# Patient Record
Sex: FEMALE | Race: WHITE | Employment: OTHER | URBAN - METROPOLITAN AREA
[De-identification: names, ages, dates, MRNs, and addresses within clinical notes are randomized per-mention and may not be internally consistent; named-entity substitution may affect disease eponyms.]

---

## 2017-11-30 ENCOUNTER — HOSPITAL ENCOUNTER (OUTPATIENT)
Dept: MAMMOGRAPHY | Age: 77
Discharge: HOME | End: 2017-11-30
Attending: INTERNAL MEDICINE
Payer: MEDICARE

## 2017-11-30 DIAGNOSIS — Z12.31 VISIT FOR SCREENING MAMMOGRAM: ICD-10-CM

## 2017-11-30 PROCEDURE — 77063 BREAST TOMOSYNTHESIS BI: CPT

## 2018-12-05 ENCOUNTER — HOSPITAL ENCOUNTER (OUTPATIENT)
Dept: MAMMOGRAPHY | Age: 78
Discharge: HOME | End: 2018-12-05
Attending: INTERNAL MEDICINE
Payer: MEDICARE

## 2018-12-05 DIAGNOSIS — Z12.39 BREAST SCREENING: ICD-10-CM

## 2018-12-05 PROCEDURE — 77063 BREAST TOMOSYNTHESIS BI: CPT

## 2020-02-13 ENCOUNTER — HOSPITAL ENCOUNTER (OUTPATIENT)
Dept: MAMMOGRAPHY | Age: 80
Discharge: HOME | End: 2020-02-13
Attending: INTERNAL MEDICINE
Payer: MEDICARE

## 2020-02-13 DIAGNOSIS — Z12.31 VISIT FOR SCREENING MAMMOGRAM: ICD-10-CM

## 2020-02-13 PROCEDURE — 77063 BREAST TOMOSYNTHESIS BI: CPT

## 2020-08-25 ENCOUNTER — OFFICE VISIT (OUTPATIENT)
Dept: OBGYN CLINIC | Age: 80
End: 2020-08-25
Payer: MEDICARE

## 2020-08-25 VITALS
DIASTOLIC BLOOD PRESSURE: 80 MMHG | WEIGHT: 115.38 LBS | SYSTOLIC BLOOD PRESSURE: 122 MMHG | HEIGHT: 64 IN | BODY MASS INDEX: 19.7 KG/M2

## 2020-08-25 DIAGNOSIS — N89.8 VAGINAL DISCHARGE: Primary | ICD-10-CM

## 2020-08-25 PROCEDURE — 1090F PRES/ABSN URINE INCON ASSESS: CPT | Performed by: OBSTETRICS & GYNECOLOGY

## 2020-08-25 PROCEDURE — G8427 DOCREV CUR MEDS BY ELIG CLIN: HCPCS | Performed by: OBSTETRICS & GYNECOLOGY

## 2020-08-25 PROCEDURE — G8432 DEP SCR NOT DOC, RNG: HCPCS | Performed by: OBSTETRICS & GYNECOLOGY

## 2020-08-25 PROCEDURE — 1101F PT FALLS ASSESS-DOCD LE1/YR: CPT | Performed by: OBSTETRICS & GYNECOLOGY

## 2020-08-25 PROCEDURE — 99204 OFFICE O/P NEW MOD 45 MIN: CPT | Performed by: OBSTETRICS & GYNECOLOGY

## 2020-08-25 PROCEDURE — G8420 CALC BMI NORM PARAMETERS: HCPCS | Performed by: OBSTETRICS & GYNECOLOGY

## 2020-08-25 PROCEDURE — G8400 PT W/DXA NO RESULTS DOC: HCPCS | Performed by: OBSTETRICS & GYNECOLOGY

## 2020-08-25 PROCEDURE — G8536 NO DOC ELDER MAL SCRN: HCPCS | Performed by: OBSTETRICS & GYNECOLOGY

## 2020-08-25 RX ORDER — CELECOXIB 200 MG/1
200 CAPSULE ORAL DAILY
COMMUNITY

## 2020-08-25 RX ORDER — CHOLECALCIFEROL (VITAMIN D3) 125 MCG
3000 CAPSULE ORAL
COMMUNITY

## 2020-08-25 RX ORDER — PANCRELIPASE 36000; 180000; 114000 [USP'U]/1; [USP'U]/1; [USP'U]/1
CAPSULE, DELAYED RELEASE PELLETS ORAL
COMMUNITY
Start: 2020-08-14

## 2020-08-25 NOTE — PROGRESS NOTES
Vaginitis  CC: Vaginal discharge    HPI: Ms. Barbara Hernandez is a 78 y.o. No obstetric history on file. female presenting for evaluation of vaginal discharge. She has no additional complaints. She has not had previous treatment for this problem. Onset: a couple months ago  Location: vaginal  Quality: thin  Severity: bothersome  Timing: once or twice every few weeks. Not SA    Odor: no  Relation to menses? no  Pruritis? no  Irritation? no  New lesions? no  Dysuria? no  Pain? no    History of recurrent vaginal/vulvar infections? no  History of recent STIs? no  New partners? none       OB History    No obstetric history on file.          Past Medical History:   Diagnosis Date    Arthritis     osteo    Cancer ([address]     uterine - surgery    Chronic pain     arthritis    GERD (gastroesophageal reflux disease)     Nausea & vomiting     sensitive to anesthesia and pain procedure    Thyroid disease     benign thyroid polyps       Past Surgical History:   Procedure Laterality Date    HX HYSTERECTOMY      total    HX ORTHOPAEDIC      lumbar laminectomy x 2    HX ORTHOPAEDIC      surgery on foot joint to clean out arthritis    HX ORTHOPAEDIC Left     partial joint replacement big toe    HX TONSILLECTOMY         Family History   Problem Relation Age of Onset    Parkinsonism Mother     Heart Failure Father         CHF    Heart Disease Father        Social History     Socioeconomic History    Marital status:      Spouse name: Not on file    Number of children: Not on file    Years of education: Not on file    Highest education level: Not on file   Occupational History    Not on file   Social Needs    Financial resource strain: Not on file    Food insecurity     Worry: Not on file     Inability: Not on file    Transportation needs     Medical: Not on file     Non-medical: Not on file   Tobacco Use    Smoking status: Never Smoker    Smokeless tobacco: Never Used   Substance and Sexual Activity    Alcohol use: Yes     Comment: \"small amount\"    Drug use: No    Sexual activity: Not Currently   Lifestyle    Physical activity     Days per week: Not on file     Minutes per session: Not on file    Stress: Not on file   Relationships    Social connections     Talks on phone: Not on file     Gets together: Not on file     Attends [employer] service: Not on file     Active member of club or organization: Not on file     Attends meetings of clubs or organizations: Not on file     Relationship status: Not on file    Intimate partner violence     Fear of current or ex partner: Not on file     Emotionally abused: Not on file     Physically abused: Not on file     Forced sexual activity: Not on file   Other Topics Concern    Not on file   Social History Narrative    Not on file       Current Outpatient Medications   Medication Sig Dispense Refill    celecoxib (CELEBREX) 200 mg capsule Take 200 mg by mouth daily.  Creon 36,000-114,000- 180,000 unit cpDR capsule       lactase (LACTAID) 3,000 unit tablet Take 3,000 Units by mouth.  dextran 70-hypromellose (ARTIFICIAL TEARS) ophthalmic solution Administer  to both eyes as needed.  naproxen sodium (NAPROSYN) 220 mg tablet Take 220 mg by mouth two (2) times daily as needed.  CALCIUM CARBONATE/VITAMIN D3 (CALCIUM 600 + D,3, PO) Take 600 mg by mouth two (2) times a day. 2 1/2 tabs per day      DOCOSAHEXANOIC ACID/EPA (FISH OIL PO) Take 1,200 mg by mouth daily.  CINNAMON BARK (CINNAMON PO) Take 1,000 mg by mouth daily.  LACTOBACILLUS RHAMNOSUS GG (PROBIOTIC PO) Take  by mouth. Takes one po daily.  metroNIDAZOLE (NORITATE) 1 % topical cream Apply  to affected area daily. Use a thin layer to affected areas after washing      OTHER cliradex (towlettes) to clean eyelids twice daily.  esomeprazole (NEXIUM) 40 mg capsule Take  by mouth daily.  ranitidine (ZANTAC) 300 mg tablet Take 300 mg by mouth daily.       MULTIVITAMIN PO Take  by mouth. Takes 1/2 tab po daily.  levothyroxine (SYNTHROID) 50 mcg tablet Take  by mouth. Takes 50 mcg daily for 5 days a week.  OTHER Turmeric 400mg once daily. Allergies   Allergen Reactions    Other Medication Anaphylaxis     Allergic to horse serum.  Shellfish Derived Nausea and Vomiting     Allergic to certain clams and mussels.  Tetracycline Other (comments)     Allergic as child, but unsure what. Has taken since without reaction.        Review of Systems - History obtained from the patient  Constitutional: negative for weight loss, fever, night sweats  HEENT: negative for hearing loss, earache, congestion, snoring, sorethroat  CV: negative for chest pain, palpitations, edema  Resp: negative for cough, shortness of breath, wheezing  GI: negative for change in bowel habits, abdominal pain, black or bloody stools  : negative for frequency, dysuria, hematuria, + vaginal discharge as HPI  MSK: negative for back pain, joint pain, muscle pain  Skin :negative for itching, rash, hives  Neuro: negative for dizziness, headache, confusion, weakness  Psych: negative for anxiety, depression, change in mood  Heme/lymph: negative for bleeding, bruising, pallor    Physical Exam    Visit Vitals  /80 (BP 1 Location: Left arm, BP Patient Position: Sitting)   Ht 5' 4\" (1.626 m)   Wt 115 lb 6 oz (52.3 kg)   BMI 19.80 kg/m²       HENT  · Head and Face: appears normal    Neck  · Inspection/Palpation: normal appearance, no masses or tenderness  · Lymph Nodes: no lymphadenopathy present  · Thyroid: gland size normal, nontender, no nodules or masses present on palpation    Chest  · Respiratory Effort: non-labored breathing  · Auscultation: Clear to auscultation bilaterlly, normal breath sounds    Cardiovascular  · Heart:  · Auscultation: regular rate and rhythm without murmur  · Extremities: no peripheral edema    Gastrointestinal  · Abdominal Examination: abdomen non-tender to palpation, normal bowel sounds, no masses present  · Liver and spleen: no hepatomegaly present, spleen not palpable  · Hernias: no hernias identified    Genitourinary  · External Genitalia: normal appearance for age, no discharge present, no tenderness present, no inflammatory lesions present, no masses present, no atrophy present  · Vagina: normal vaginal vault without central or paravaginal defects, no inflammatory lesions present, no masses present, thin discharge present  · Bladder: non-tender to palpation  · Urethra: appears normal  · Cervix: normal, no cervicitis, no CMT  · Perineum: perineum within normal limits, no evidence of trauma, no rashes or skin lesions present    Skin  · General Inspection: no rash, no lesions identified    Neurologic/Psychiatric  · Mental Status:  · Orientation: grossly oriented to person, place and time  · Mood and Affect: mood normal, affect appropriate    Results for orders placed or performed during the hospital encounter of 08/14/14   POC H. PYLORI, TISSUE   Result Value Ref Range    H. pylori from tissue Negative Negative    Positive control positive     Negative control negative     Lot no. [id number]          Assessment/Plan:  Vaginitis     Nuswab sent. Reviewed vulvar/vaginal hygiene and irritant avoidance. RTC: for annual , or sooner prn for problems or concerns. Handouts and instructions provided.     Fany Craig  8/25/2020  1:06 PM    Signed By: Konstantin Zaragoza MD     August 25, 2020

## 2020-08-25 NOTE — PATIENT INSTRUCTIONS
Pelvic Exam: Care Instructions Your Care Instructions When your doctor examines all of your pelvic organs, it's called a pelvic exam. Two good reasons to have this kind of exam are to check for sexually transmitted infections (STIs) and to get a Pap test. A Pap test is also called a Pap smear. It checks for early changes that can lead to cancer of the cervix. Sometimes a pelvic exam is part of a regular checkup. Your doctor may ask you to avoid vaginal sex, tampons, vaginal medicines, vaginal sprays or powders, and douching for 1 to 2 days before the test. 
Other times, women have this kind of exam at any time of the month. This is because they have pelvic pain, bleeding, or discharge. Or they may have another pelvic problem. Before your exam, it's important to share some information with your doctor. For example, if you are a survivor of rape or sexual abuse, you can talk about any concerns you may have. Your doctor will also want to know if you are pregnant or use birth control. And he or she will want to hear about any problems, surgeries, or procedures you have had in your pelvic area. You will also need to tell your doctor when your last period was. Follow-up care is a key part of your treatment and safety. Be sure to make and go to all appointments, and call your doctor if you are having problems. It's also a good idea to know your test results and keep a list of the medicines you take. How is a pelvic exam done? · During a pelvic exam, you will: ? Take off your clothes below the waist. You will get a paper or cloth cover to put over the lower half of your body. If this is regular checkup, you may undress completely and put on a gown. ? Lie on your back on an exam table. Your feet will be raised above you. Stirrups will support your feet. · The doctor will: ? Ask you to relax your knees. Your knees need to lean out, toward the walls. ? Check the opening of your vagina for sores or swelling. ? Gently put a tool called a speculum into your vagina. It opens the vagina a little bit. You will feel some pressure. But if you are relaxed, it will not hurt. It lets your doctor see inside the vagina. ? Use a small brush, spatula, or swab to get a sample of cells, if you are having a Pap test or culture. The doctor then removes the speculum. ? Put on gloves and put one or two fingers of one hand into your vagina. The other hand goes on your lower belly. This lets your doctor feel your pelvic organs. You will probably feel some pressure. Try to stay relaxed. ? Put one gloved finger into your rectum and one into your vagina, if needed. This can also help check your pelvic organs. This exam takes about 10 minutes. At the end, you will get a washcloth or tissue to clean your vaginal area. You can then get dressed. Why is a pelvic exam done? A pelvic exam may be done: · As part of a woman's regular physical checkup. The exam may include a Pap test. 
· To check for vaginal infection. · To check for sexually transmitted infections, such as chlamydia or herpes. · To help find the cause of abnormal uterine bleeding. · To look for problems like uterine fibroids, ovarian cysts, or uterine prolapse. · To find the cause of pelvic or belly pain. · Before inserting an intrauterine device (IUD) for birth control. · To collect evidence if you've been sexually assaulted. What are the risks of a pelvic exam? 
There is a small chance that the doctor will find something on a pelvic exam that would not have caused a problem. This is called overdiagnosis. It could lead to tests or treatment you don't need. When should you call for help? Watch closely for changes in your health, and be sure to contact your doctor if you have any problems. Where can you learn more? Go to http://www.wizboo.com/ Enter L332 in the search box to learn more about \"Pelvic Exam: Care Instructions. \" Current as of: November 8, 2019               Content Version: 12.5 © 6515-7228 [employer]. Care instructions adapted under license by [employer] (which disclaims liability or warranty for this information). If you have questions about a medical condition or this instruction, always ask your healthcare professional. [place] [address] any warranty or liability for your use of this information.

## 2020-08-28 LAB
A VAGINAE DNA VAG QL NAA+PROBE: NORMAL SCORE
BVAB2 DNA VAG QL NAA+PROBE: NORMAL SCORE
C ALBICANS DNA VAG QL NAA+PROBE: NEGATIVE
C GLABRATA DNA VAG QL NAA+PROBE: NEGATIVE
C TRACH DNA VAG QL NAA+PROBE: NEGATIVE
MEGA1 DNA VAG QL NAA+PROBE: NORMAL SCORE
N GONORRHOEA DNA VAG QL NAA+PROBE: NEGATIVE
T VAGINALIS DNA VAG QL NAA+PROBE: NEGATIVE

## 2020-09-09 ENCOUNTER — TELEPHONE (OUTPATIENT)
Dept: OBGYN CLINIC | Age: 80
End: 2020-09-09

## 2020-09-09 NOTE — TELEPHONE ENCOUNTER
Called and spoke with patient regarding her normal and negative Nuswab results. Patient states she hasn't had any discharge recently. Per Dr. Unique Frey patient to call back if discharge comes back.

## 2021-02-05 ENCOUNTER — HOSPITAL ENCOUNTER (OUTPATIENT)
Dept: GENERAL RADIOLOGY | Age: 81
Discharge: HOME | End: 2021-02-05
Attending: INTERNAL MEDICINE
Payer: MEDICARE

## 2021-02-05 ENCOUNTER — TRANSCRIBE ORDER (OUTPATIENT)
Dept: GENERAL RADIOLOGY | Age: 81
End: 2021-02-05

## 2021-02-05 DIAGNOSIS — M54.50 LOW BACK PAIN: Primary | ICD-10-CM

## 2021-02-05 DIAGNOSIS — M54.50 LOW BACK PAIN: ICD-10-CM

## 2021-02-05 PROCEDURE — 72100 X-RAY EXAM L-S SPINE 2/3 VWS: CPT

## 2021-03-05 ENCOUNTER — TRANSCRIBE ORDER (OUTPATIENT)
Dept: SCHEDULING | Age: 81
End: 2021-03-05

## 2021-03-05 DIAGNOSIS — Z12.31 BREAST CANCER SCREENING BY MAMMOGRAM: Primary | ICD-10-CM

## 2021-05-14 ENCOUNTER — HOSPITAL ENCOUNTER (OUTPATIENT)
Dept: MAMMOGRAPHY | Age: 81
Discharge: HOME | End: 2021-05-14
Attending: INTERNAL MEDICINE
Payer: MEDICARE

## 2021-05-14 DIAGNOSIS — Z12.31 BREAST CANCER SCREENING BY MAMMOGRAM: ICD-10-CM

## 2021-05-14 PROCEDURE — 77063 BREAST TOMOSYNTHESIS BI: CPT

## 2021-05-14 PROCEDURE — 77062 BREAST TOMOSYNTHESIS BI: CPT

## 2021-09-13 ENCOUNTER — OFFICE VISIT (OUTPATIENT)
Dept: NEUROLOGY | Age: 81
End: 2021-09-13
Payer: MEDICARE

## 2021-09-13 VITALS
HEART RATE: 74 BPM | OXYGEN SATURATION: 98 % | WEIGHT: 117.4 LBS | BODY MASS INDEX: 20.8 KG/M2 | SYSTOLIC BLOOD PRESSURE: 120 MMHG | DIASTOLIC BLOOD PRESSURE: 70 MMHG | HEIGHT: 63 IN

## 2021-09-13 DIAGNOSIS — R41.3 MEMORY LOSS: Primary | ICD-10-CM

## 2021-09-13 PROCEDURE — G8420 CALC BMI NORM PARAMETERS: HCPCS | Performed by: PSYCHIATRY & NEUROLOGY

## 2021-09-13 PROCEDURE — G8510 SCR DEP NEG, NO PLAN REQD: HCPCS | Performed by: PSYCHIATRY & NEUROLOGY

## 2021-09-13 PROCEDURE — 1090F PRES/ABSN URINE INCON ASSESS: CPT | Performed by: PSYCHIATRY & NEUROLOGY

## 2021-09-13 PROCEDURE — 1101F PT FALLS ASSESS-DOCD LE1/YR: CPT | Performed by: PSYCHIATRY & NEUROLOGY

## 2021-09-13 PROCEDURE — 99204 OFFICE O/P NEW MOD 45 MIN: CPT | Performed by: PSYCHIATRY & NEUROLOGY

## 2021-09-13 PROCEDURE — G8427 DOCREV CUR MEDS BY ELIG CLIN: HCPCS | Performed by: PSYCHIATRY & NEUROLOGY

## 2021-09-13 PROCEDURE — G8536 NO DOC ELDER MAL SCRN: HCPCS | Performed by: PSYCHIATRY & NEUROLOGY

## 2021-09-13 PROCEDURE — G8400 PT W/DXA NO RESULTS DOC: HCPCS | Performed by: PSYCHIATRY & NEUROLOGY

## 2021-09-13 RX ORDER — DICLOFENAC SODIUM 10 MG/G
GEL TOPICAL
COMMUNITY

## 2021-09-13 RX ORDER — DONEPEZIL HYDROCHLORIDE 5 MG/1
TABLET, FILM COATED ORAL
COMMUNITY
Start: 2021-02-23 | End: 2021-09-13 | Stop reason: SDUPTHER

## 2021-09-13 RX ORDER — GUAIFENESIN 100 MG/5ML
81 LIQUID (ML) ORAL DAILY
COMMUNITY

## 2021-09-13 RX ORDER — DONEPEZIL HYDROCHLORIDE 5 MG/1
5 TABLET, FILM COATED ORAL
Qty: 90 TABLET | Refills: 2 | Status: SHIPPED | OUTPATIENT
Start: 2021-09-13 | End: 2022-08-19

## 2021-09-13 RX ORDER — CYANOCOBALAMIN/FOLIC ACID 1MG-400MCG
LOZENGE SUBLINGUAL
COMMUNITY

## 2021-09-13 RX ORDER — IPRATROPIUM BROMIDE 21 UG/1
2 SPRAY, METERED NASAL EVERY 12 HOURS
COMMUNITY

## 2021-09-13 NOTE — PROGRESS NOTES
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 She is here with her , Lakshmi Pabon, to discuss these new symptoms. She tells me she was seen by a neurologist in the community, Dr. Dilma Morgan, at some point in time and was started on donepezil. She thinks she has been on donepezil maybe 18 months but she could not recall what testing she has had done for this. She thinks her memory has not been so good for about 5 years mainly with issues remembering short-term situations like names and conversations. She might repeat her questions quickly. When she drives, she is not getting lost but she does take a little bit longer and has to be more careful. Her ADLs are all intact such as dressing bathing and toileting. She does not miss her medications as long as she has her system in place. Sleep is a little bit inconsistent she thinks since she has been taking donepezil at bedtime. She is prediabetic and is very careful about her diet. No unusual headaches. No numbness or weakness. She used to have a history of thyroid dysfunction but no longer takes medication but is unclear of the state of her thyroid. Review of Systems   Musculoskeletal: Negative for falls. Neurological: Negative for loss of consciousness. Psychiatric/Behavioral: Positive for memory loss. Negative for depression. The patient has insomnia. All other systems reviewed and are negative.       Past Medical History:   Diagnosis Date    Arthritis     osteo    Cancer ([facility] [address]     uterine - surgery    Chronic pain     arthritis    GERD (gastroesophageal reflux disease)     Nausea & vomiting     sensitive to anesthesia and pain procedure    Thyroid disease     benign thyroid polyps     Family History   Problem Relation Age of Onset    Parkinsonism Mother     Heart Failure Father CHF    Heart Disease Father      Social History     Socioeconomic History    Marital status:      Spouse name: Not on file    Number of children: Not on file    Years of education: Not on file    Highest education level: Not on file   Occupational History    Not on file   Tobacco Use    Smoking status: Never Smoker    Smokeless tobacco: Never Used   Substance and Sexual Activity    Alcohol use: Yes     Comment: \"small amount\"    Drug use: No    Sexual activity: Not Currently   Other Topics Concern    Not on file   Social History Narrative    Not on file     Social Determinants of Health     Financial Resource Strain:     Difficulty of Paying Living Expenses:    Food Insecurity:     Worried About Running Out of Food in the Last Year:     [address] in the Last Year:    Transportation Needs:     Lack of Transportation (Medical):  Lack of Transportation (Non-Medical):    Physical Activity:     Days of Exercise per Week:     Minutes of Exercise per Session:    Stress:     Feeling of Stress :    Social Connections:     Frequency of Communication with Friends and Family:     Frequency of Social Gatherings with Friends and Family:     Attends Church Services:     Active Member of Clubs or Organizations:     Attends Club or Organization Meetings:     Marital Status:    Intimate Partner Violence:     Fear of Current or Ex-Partner:     Emotionally Abused:     Physically Abused:     Sexually Abused:      Current Outpatient Medications   Medication Sig    diclofenac (Voltaren Arthritis Pain) 1 % gel apply to affected area 4 times a day    aspirin 81 mg chewable tablet Take 81 mg by mouth daily.  cyanocobalamin/folic acid (vitamin C19-ULZSE acid) 3,938-320 mcg lozg     ipratropium (ATROVENT) 21 mcg (0.03 %) nasal spray 2 Sprays by Nasal route every twelve (12) hours.     OTHER Mature Multi Vitamins & Minerals    donepeziL (ARICEPT) 5 mg tablet Take 1 Tablet by mouth daily (with breakfast).  celecoxib (CELEBREX) 200 mg capsule Take 200 mg by mouth daily.  Creon 36,000-114,000- 180,000 unit cpDR capsule     lactase (LACTAID) 3,000 unit tablet Take 3,000 Units by mouth.  naproxen sodium (NAPROSYN) 220 mg tablet Take 220 mg by mouth two (2) times daily as needed.  CALCIUM CARBONATE/VITAMIN D3 (CALCIUM 600 + D,3, PO) Take 600 mg by mouth two (2) times a day. 2 1/2 tabs per day    CINNAMON BARK (CINNAMON PO) Take 1,000 mg by mouth daily.  LACTOBACILLUS RHAMNOSUS GG (PROBIOTIC PO) Take  by mouth. Takes one po daily.  metroNIDAZOLE (NORITATE) 1 % topical cream Apply  to affected area daily. Use a thin layer to affected areas after washing    dextran 70-hypromellose (ARTIFICIAL TEARS) ophthalmic solution Administer  to both eyes as needed. (Patient not taking: Reported on 9/13/2021)    DOCOSAHEXANOIC ACID/EPA (FISH OIL PO) Take 1,200 mg by mouth daily. (Patient not taking: Reported on 9/13/2021)    OTHER cliradex (towlettes) to clean eyelids twice daily. (Patient not taking: Reported on 9/13/2021)     No current facility-administered medications for this visit. Allergies   Allergen Reactions    Other Medication Anaphylaxis     Allergic to horse serum.  Shellfish Derived Nausea and Vomiting     Allergic to certain clams and mussels.  Tetracycline Other (comments)     Allergic as child, but unsure what. Has taken since without reaction. Neurologic Exam     Mental Status   Oriented to person, place, and time. She knows the month year and day of the week but not the actual date. She can spell ocean forward and backward. Cranial Nerves   Cranial nerves II through XII intact. Motor Exam   Muscle bulk: normal    Strength   Strength 5/5 throughout.      Sensory Exam   Light touch normal.     Gait, Coordination, and Reflexes     Gait  Gait: normal    Coordination   Finger to nose coordination: normal    Tremor   Resting tremor: absent    Physical Exam  Vitals and nursing note reviewed. Constitutional:       Appearance: Normal appearance. Neurological:      Mental Status: She is alert and oriented to person, place, and time. Coordination: Finger-Nose-Finger Test normal.      Gait: Gait is intact. Deep Tendon Reflexes: Strength normal.       Visit Vitals  /70   Pulse 74   Ht 5' 3\" (1.6 m)   Wt 117 lb 6.4 oz (53.3 kg)   SpO2 98%   BMI 20.80 kg/m²       No recent blood work or imaging to review        Assessment and Plan   Diagnoses and all orders for this visit:    1. Memory loss  -     REFERRAL TO PSYCHOLOGY  -     MRI BRAIN WO CONT; Future  -     VITAMIN B12 & FOLATE; Future  -     TSH 3RD GENERATION; Future  -     T4 (THYROXINE); Future    Other orders  -     donepeziL (ARICEPT) 5 mg tablet; Take 1 Tablet by mouth daily (with breakfast). 80-year-old woman presenting with short-term memory loss that has seemingly been getting worse in the past 5 years noticed particularly by her  whom she been  to for 19 years. On exam I am not seeing any red flags for true clear dementia. She is alert and oriented to all spheres. She can follow commands very well. I think I would like her to complete formal neuropsychological testing at this point to help us clarify to what degree we are dealing with cognitive changes and/or any attention issues. Stay on donepezil like she has been doing but take it in the morning with breakfast in lieu of bedtime which could be affecting her sleep. Check some blood work. MRI brain ordered to look at any degree of vascular disease that could be. More exercise. Maintain plant-based nutrition. We will let her know the results of the imaging but if she gets her testing done sooner with psychology we can bring her in sooner.   45 minutes of time was spent reviewing the medical record today to include a significant portion face-to-face with her and her  getting collateral history, completion of documentation. I reviewed and decided to continue the current medications. This clinical note was dictated with an electronic dictation software that can make unintentional errors. If there are any questions, please contact me directly for clarification. A notice of this visit/encounter being completed has been sent electronically to the patient's PCP and/or referring provider.      [address] [facility], [facility] [address]  Diplomate [place]

## 2021-09-14 LAB
FOLATE SERPL-MCNC: >20 NG/ML
T4 SERPL-MCNC: 6.1 UG/DL (ref 4.5–12)
TSH SERPL DL<=0.005 MIU/L-ACNC: 1.1 UIU/ML (ref 0.45–4.5)
VIT B12 SERPL-MCNC: 1423 PG/ML (ref 232–1245)

## 2021-09-17 ENCOUNTER — TELEPHONE (OUTPATIENT)
Dept: NEUROLOGY | Age: 81
End: 2021-09-17

## 2021-09-17 ENCOUNTER — HOSPITAL ENCOUNTER (OUTPATIENT)
Dept: MRI IMAGING | Age: 81
Discharge: HOME | End: 2021-09-17
Attending: PSYCHIATRY & NEUROLOGY
Payer: MEDICARE

## 2021-09-17 DIAGNOSIS — R41.3 MEMORY LOSS: ICD-10-CM

## 2021-09-17 PROCEDURE — 70551 MRI BRAIN STEM W/O DYE: CPT

## 2021-09-17 NOTE — TELEPHONE ENCOUNTER
----- Message from Sandy Santos sent at 9/14/2021  1:43 PM EDT -----  Regarding: Dr. Sarah Coronado first and last name: Doctor: Melissa Jackson ()      Reason for call: Referral # [id number] From Jon Garcia required yes/no and why: Yes      Best contact number(s): [phone]      Details to clarify the request: The patient has a referral from Dr. Familia Santos to get testing by Dr. Angy Kitchen.  Referral # [id number]      Sandy Santos

## 2021-09-21 NOTE — PROGRESS NOTES
MRI shows atrophy and chronic ischemic changes but no acute issue. Main concern was to make sure there was not any large hematoma or structural lesion. Continue with the current plans in progress.

## 2022-03-21 ENCOUNTER — OFFICE VISIT (OUTPATIENT)
Dept: NEUROLOGY | Age: 82
End: 2022-03-21
Payer: MEDICARE

## 2022-03-21 VITALS
WEIGHT: 112 LBS | OXYGEN SATURATION: 100 % | HEIGHT: 63 IN | HEART RATE: 94 BPM | BODY MASS INDEX: 19.84 KG/M2 | SYSTOLIC BLOOD PRESSURE: 148 MMHG | DIASTOLIC BLOOD PRESSURE: 82 MMHG

## 2022-03-21 DIAGNOSIS — R41.3 MEMORY LOSS: Primary | ICD-10-CM

## 2022-03-21 PROCEDURE — G8536 NO DOC ELDER MAL SCRN: HCPCS | Performed by: PSYCHIATRY & NEUROLOGY

## 2022-03-21 PROCEDURE — G8420 CALC BMI NORM PARAMETERS: HCPCS | Performed by: PSYCHIATRY & NEUROLOGY

## 2022-03-21 PROCEDURE — 99214 OFFICE O/P EST MOD 30 MIN: CPT | Performed by: PSYCHIATRY & NEUROLOGY

## 2022-03-21 PROCEDURE — G8432 DEP SCR NOT DOC, RNG: HCPCS | Performed by: PSYCHIATRY & NEUROLOGY

## 2022-03-21 PROCEDURE — G8427 DOCREV CUR MEDS BY ELIG CLIN: HCPCS | Performed by: PSYCHIATRY & NEUROLOGY

## 2022-03-21 PROCEDURE — 1101F PT FALLS ASSESS-DOCD LE1/YR: CPT | Performed by: PSYCHIATRY & NEUROLOGY

## 2022-03-21 PROCEDURE — 1090F PRES/ABSN URINE INCON ASSESS: CPT | Performed by: PSYCHIATRY & NEUROLOGY

## 2022-03-21 PROCEDURE — G8400 PT W/DXA NO RESULTS DOC: HCPCS | Performed by: PSYCHIATRY & NEUROLOGY

## 2022-03-21 RX ORDER — CHLORPHENIRAMINE MALEATE 4 MG
TABLET ORAL
COMMUNITY

## 2022-03-21 NOTE — PROGRESS NOTES
Chief Complaint   Patient presents with    Follow-up     memory loss \"seems to be worsening, I got lost on the way here. \"       HPI    80-year-old woman following up. Since I saw her last she completed an MRI showing chronic ischemic changes. Neuropsych testing has not been done yet. Blood work okay. She is here little bit late today because she got lost coming here. She is here alone today. Since I saw her last overall she tells me she is stable with the exception of the last 2 days which she seems a little \"off\". Maybe a little bit more anxiety and stressors but nothing clearly different. No numbness or weakness. No vision changes. Her sleep remains good. She does not drive much with the exception of going to the grocery store. She works outside quite a bit. She remains on low-dose Aricept. She has not seen neuropsychology yet but has an appointment pending in less than 2 months. 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 She is here with her , Gisel Soto, to discuss these new symptoms. She tells me she was seen by a neurologist in the community, Dr. Daniel Beasley, at some point in time and was started on donepezil. She thinks she has been on donepezil maybe 18 months but she could not recall what testing she has had done for this. She thinks her memory has not been so good for about 5 years mainly with issues remembering short-term situations like names and conversations. She might repeat her questions quickly. When she drives, she is not getting lost but she does take a little bit longer and has to be more careful. Her ADLs are all intact such as dressing bathing and toileting. She does not miss her medications as long as she has her system in place. Sleep is a little bit inconsistent she thinks since she has been taking donepezil at bedtime. She is prediabetic and is very careful about her diet. No unusual headaches.   No numbness or weakness. She used to have a history of thyroid dysfunction but no longer takes medication but is unclear of the state of her thyroid. Review of Systems   Psychiatric/Behavioral: Positive for memory loss. The patient is nervous/anxious. All other systems reviewed and are negative. Past Medical History:   Diagnosis Date    Arthritis     osteo    Cancer ([facility] [address]     uterine - surgery    Chronic pain     arthritis    GERD (gastroesophageal reflux disease)     Nausea & vomiting     sensitive to anesthesia and pain procedure    Thyroid disease     benign thyroid polyps     Family History   Problem Relation Age of Onset    Parkinsonism Mother     Heart Failure Father         CHF    Heart Disease Father      Social History     Socioeconomic History    Marital status:      Spouse name: Not on file    Number of children: Not on file    Years of education: Not on file    Highest education level: Not on file   Occupational History    Not on file   Tobacco Use    Smoking status: Never Smoker    Smokeless tobacco: Never Used   Substance and Sexual Activity    Alcohol use: Yes     Comment: \"small amount\"    Drug use: No    Sexual activity: Not Currently   Other Topics Concern    Not on file   Social History Narrative    Not on file     Social Determinants of Health     Financial Resource Strain:     Difficulty of Paying Living Expenses: Not on file   Food Insecurity:     Worried About Running Out of Food in the Last Year: Not on file    Magaly of Food in the Last Year: Not on file   Transportation Needs:     Lack of Transportation (Medical): Not on file    Lack of Transportation (Non-Medical):  Not on file   Physical Activity:     Days of Exercise per Week: Not on file    Minutes of Exercise per Session: Not on file   Stress:     Feeling of Stress : Not on file   Social Connections:     Frequency of Communication with Friends and Family: Not on file    Frequency of Social Gatherings with Friends and Family: Not on file    Attends Faith Services: Not on file    Active Member of Clubs or Organizations: Not on file    Attends Club or Organization Meetings: Not on file    Marital Status: Not on file   Intimate Partner Violence:     Fear of Current or Ex-Partner: Not on file    Emotionally Abused: Not on file    Physically Abused: Not on file    Sexually Abused: Not on file   Housing Stability:     Unable to Pay for Housing in the Last Year: Not on file    Number of [place] in the Last Year: Not on file    Unstable Housing in the Last Year: Not on file     Allergies   Allergen Reactions    Other Medication Anaphylaxis     Allergic to horse serum.  Shellfish Derived Nausea and Vomiting     Allergic to certain clams and mussels.  Tetracycline Other (comments)     Allergic as child, but unsure what. Has taken since without reaction. Current Outpatient Medications   Medication Sig    omega-3 fatty acids-fish oil (Fish OiL) 360-1,200 mg cap     diclofenac (Voltaren Arthritis Pain) 1 % gel apply to affected area 4 times a day    aspirin 81 mg chewable tablet Take 81 mg by mouth daily.  cyanocobalamin/folic acid (vitamin Q42-OTELK acid) 5,576-970 mcg lozg     ipratropium (ATROVENT) 21 mcg (0.03 %) nasal spray 2 Sprays by Nasal route every twelve (12) hours.  OTHER Mature Multi Vitamins & Minerals    donepeziL (ARICEPT) 5 mg tablet Take 1 Tablet by mouth daily (with breakfast).  celecoxib (CELEBREX) 200 mg capsule Take 200 mg by mouth daily.  Creon 36,000-114,000- 180,000 unit cpDR capsule     lactase (LACTAID) 3,000 unit tablet Take 3,000 Units by mouth.  naproxen sodium (NAPROSYN) 220 mg tablet Take 220 mg by mouth two (2) times daily as needed.  CALCIUM CARBONATE/VITAMIN D3 (CALCIUM 600 + D,3, PO) Take 600 mg by mouth two (2) times a day. 2 1/2 tabs per day    CINNAMON BARK (CINNAMON PO) Take 1,000 mg by mouth daily.     LACTOBACILLUS RHAMNOSUS GG (PROBIOTIC PO) Take  by mouth. Takes one po daily.  metroNIDAZOLE (NORITATE) 1 % topical cream Apply  to affected area daily. Use a thin layer to affected areas after washing    Lactobacillus acidophilus 10 billion cell cap  (Patient not taking: Reported on 3/21/2022)    dextran 70-hypromellose (ARTIFICIAL TEARS) ophthalmic solution Administer  to both eyes as needed. (Patient not taking: Reported on 9/13/2021)    DOCOSAHEXANOIC ACID/EPA (FISH OIL PO) Take 1,200 mg by mouth daily. (Patient not taking: Reported on 9/13/2021)    OTHER cliradex (towlettes) to clean eyelids twice daily. (Patient not taking: Reported on 9/13/2021)     No current facility-administered medications for this visit. Neurologic Exam     Mental Status   WD/WN adult in NAD, normal grooming  VSS  A&O x 3, she knows the month date and year. She can spell ocean forward and backward. PERRL, nonicteric  Face is symmetric, tongue midline  Speech is fluent and clear  No limb ataxia. No abnl movements. Moving all extemities spontaneously and symmetric  Normal gait           Visit Vitals  BP (!) 148/82 (BP 1 Location: Right upper arm, BP Patient Position: Sitting)   Pulse 94   Ht 5' 3\" (1.6 m)   Wt 112 lb (50.8 kg)   SpO2 100%   BMI 19.84 kg/m²       Assessment and Plan   Diagnoses and all orders for this visit:    1. Memory loss  -     REFERRAL TO OCCUPATIONAL THERAPY  -     PET BRAIN METABOLIC EVAL (INI); Future      66-year-old woman presenting with memory loss. I still want her to complete neuropsych testing so we can objectify the degree of impairment we could be dealing with and/or if there are other issues such as coinciding depression or anxiety. The past couple days she does seem a little bit off but I am not hearing or seeing any stroke signs or symptoms. I want her to stay on Aricept as is. We will not increase at this time.   I want her to complete a driving evaluation as well in accordance with [place] driving law. Limit her driving now. Her  needs to take her to her neuropsych appointment in less than 2 months. I am also going to complete a PET scan looking for any objective findings to support Alzheimer's which could support increasing medication further. She agrees. 30 minutes of time was taken in total reviewing the medical record to include personal review of the MRI, face-to-face time, and time completing documentation today. I reviewed and decided to continue the current medications. This clinical note was dictated with an electronic dictation software that can make unintentional errors. If there are any questions, please contact me directly for clarification.       [address] [facility], [facility] [address]  Diplomate [employer]

## 2022-03-21 NOTE — LETTER
3/21/2022    Patient: Iván Gray   YOB: 1940   Date of Visit: 3/21/2022     Arianne Mae MD  [address] [address]  Via Fax: [phone]    Dear Arianne Mae MD,      Thank you for referring Ms. Sebas Bergeron to [address] [place] [address] for evaluation. My notes for this consultation are attached. If you have questions, please do not hesitate to call me. I look forward to following your patient along with you. Sincerely,    Hunter Castillo, DO    3/21/2022     Patient:  Iván Gray   YOB: 1940  Date of Visit: 3/21/2022      Dear Arianne Mae MD  [facility] [address] [address]  Via Fax: [phone]:      I was requested by James Moer MD to evaluate Ms. Sebas Bergeron  for   Chief Complaint   Patient presents with    Follow-up     memory loss \"seems to be worsening, I got lost on the way here. \"   . I am recommending the following:     Diagnoses and all orders for this visit:    1. Memory loss  -     REFERRAL TO OCCUPATIONAL THERAPY  -     PET BRAIN METABOLIC EVAL (INI); Future        ----------------------------------------------------------------------------------------------------------------------  Below is my encounter:       Chief Complaint   Patient presents with    Follow-up     memory loss \"seems to be worsening, I got lost on the way here. \"       HPI    20-year-old woman following up. Since I saw her last she completed an MRI showing chronic ischemic changes. Neuropsych testing has not been done yet. Blood work okay. She is here little bit late today because she got lost coming here. She is here alone today. Since I saw her last overall she tells me she is stable with the exception of the last 2 days which she seems a little \"off\". Maybe a little bit more anxiety and stressors but nothing clearly different. No numbness or weakness. No vision changes.   Her sleep remains good. She does not drive much with the exception of going to the grocery store. She works outside quite a bit. She remains on low-dose Aricept. She has not seen neuropsychology yet but has an appointment pending in less than 2 months. BKGD:   Jeovanny Jethro is an 31-year-old woman, retired /, here for memory loss. She is here with her , Chanel Alicea, to discuss these new symptoms. She tells me she was seen by a neurologist in the community, Dr. Donnell Awan, at some point in time and was started on donepezil. She thinks she has been on donepezil maybe 18 months but she could not recall what testing she has had done for this. She thinks her memory has not been so good for about 5 years mainly with issues remembering short-term situations like names and conversations. She might repeat her questions quickly. When she drives, she is not getting lost but she does take a little bit longer and has to be more careful. Her ADLs are all intact such as dressing bathing and toileting. She does not miss her medications as long as she has her system in place. Sleep is a little bit inconsistent she thinks since she has been taking donepezil at bedtime. She is prediabetic and is very careful about her diet. No unusual headaches. No numbness or weakness. She used to have a history of thyroid dysfunction but no longer takes medication but is unclear of the state of her thyroid. Review of Systems   Psychiatric/Behavioral: Positive for memory loss. The patient is nervous/anxious. All other systems reviewed and are negative.       Past Medical History:   Diagnosis Date    Arthritis     osteo    Cancer ([address]     uterine - surgery    Chronic pain     arthritis    GERD (gastroesophageal reflux disease)     Nausea & vomiting     sensitive to anesthesia and pain procedure    Thyroid disease     benign thyroid polyps     Family History   Problem Relation Age of Onset    Parkinsonism Mother     Heart Failure Father         CHF    Heart Disease Father      Social History     Socioeconomic History    Marital status:      Spouse name: Not on file    Number of children: Not on file    Years of education: Not on file    Highest education level: Not on file   Occupational History    Not on file   Tobacco Use    Smoking status: Never Smoker    Smokeless tobacco: Never Used   Substance and Sexual Activity    Alcohol use: Yes     Comment: \"small amount\"    Drug use: No    Sexual activity: Not Currently   Other Topics Concern    Not on file   Social History Narrative    Not on file     Social Determinants of Health     Financial Resource Strain:     Difficulty of Paying Living Expenses: Not on file   Food Insecurity:     Worried About Running Out of Food in the Last Year: Not on file    Magaly of Food in the Last Year: Not on file   Transportation Needs:     Lack of Transportation (Medical): Not on file    Lack of Transportation (Non-Medical):  Not on file   Physical Activity:     Days of Exercise per Week: Not on file    Minutes of Exercise per Session: Not on file   Stress:     Feeling of Stress : Not on file   Social Connections:     Frequency of Communication with Friends and Family: Not on file    Frequency of Social Gatherings with Friends and Family: Not on file    Attends Holiness Services: Not on file    Active Member of [address] [place] [address] [employer] or Organizations: Not on file    Attends Club or Organization Meetings: Not on file    Marital Status: Not on file   Intimate Partner Violence:     Fear of Current or Ex-Partner: Not on file    Emotionally Abused: Not on file    Physically Abused: Not on file    Sexually Abused: Not on file   Housing Stability:     Unable to Pay for Housing in the Last Year: Not on file    Number of [place] in the Last Year: Not on file    Unstable Housing in the Last Year: Not on file     Allergies   Allergen Reactions    Other Medication Anaphylaxis     Allergic to horse serum.  Shellfish Derived Nausea and Vomiting     Allergic to certain clams and mussels.  Tetracycline Other (comments)     Allergic as child, but unsure what. Has taken since without reaction. Current Outpatient Medications   Medication Sig    omega-3 fatty acids-fish oil (Fish OiL) 360-1,200 mg cap     diclofenac (Voltaren Arthritis Pain) 1 % gel apply to affected area 4 times a day    aspirin 81 mg chewable tablet Take 81 mg by mouth daily.  cyanocobalamin/folic acid (vitamin A30-CVBBS acid) 2,726-714 mcg lozg     ipratropium (ATROVENT) 21 mcg (0.03 %) nasal spray 2 Sprays by Nasal route every twelve (12) hours.  OTHER Mature Multi Vitamins & Minerals    donepeziL (ARICEPT) 5 mg tablet Take 1 Tablet by mouth daily (with breakfast).  celecoxib (CELEBREX) 200 mg capsule Take 200 mg by mouth daily.  Creon 36,000-114,000- 180,000 unit cpDR capsule     lactase (LACTAID) 3,000 unit tablet Take 3,000 Units by mouth.  naproxen sodium (NAPROSYN) 220 mg tablet Take 220 mg by mouth two (2) times daily as needed.  CALCIUM CARBONATE/VITAMIN D3 (CALCIUM 600 + D,3, PO) Take 600 mg by mouth two (2) times a day. 2 1/2 tabs per day    CINNAMON BARK (CINNAMON PO) Take 1,000 mg by mouth daily.  LACTOBACILLUS RHAMNOSUS GG (PROBIOTIC PO) Take  by mouth. Takes one po daily.  metroNIDAZOLE (NORITATE) 1 % topical cream Apply  to affected area daily. Use a thin layer to affected areas after washing    Lactobacillus acidophilus 10 billion cell cap  (Patient not taking: Reported on 3/21/2022)    dextran 70-hypromellose (ARTIFICIAL TEARS) ophthalmic solution Administer  to both eyes as needed. (Patient not taking: Reported on 9/13/2021)    DOCOSAHEXANOIC ACID/EPA (FISH OIL PO) Take 1,200 mg by mouth daily. (Patient not taking: Reported on 9/13/2021)    OTHER cliradex (towlettes) to clean eyelids twice daily.  (Patient not taking: Reported on 9/13/2021)     No current facility-administered medications for this visit. Neurologic Exam     Mental Status   WD/WN adult in NAD, normal grooming  VSS  A&O x 3, she knows the month date and year. She can spell ocean forward and backward. PERRL, nonicteric  Face is symmetric, tongue midline  Speech is fluent and clear  No limb ataxia. No abnl movements. Moving all extemities spontaneously and symmetric  Normal gait           Visit Vitals  BP (!) 148/82 (BP 1 Location: Right upper arm, BP Patient Position: Sitting)   Pulse 94   Ht 5' 3\" (1.6 m)   Wt 112 lb (50.8 kg)   SpO2 100%   BMI 19.84 kg/m²       Assessment and Plan   Diagnoses and all orders for this visit:    1. Memory loss  -     REFERRAL TO OCCUPATIONAL THERAPY  -     PET BRAIN METABOLIC EVAL (INI); Future      26-year-old woman presenting with memory loss. I still want her to complete neuropsych testing so we can objectify the degree of impairment we could be dealing with and/or if there are other issues such as coinciding depression or anxiety. The past couple days she does seem a little bit off but I am not hearing or seeing any stroke signs or symptoms. I want her to stay on Aricept as is. We will not increase at this time. I want her to complete a driving evaluation as well in accordance with [place] driving law. Limit her driving now. Her  needs to take her to her neuropsych appointment in less than 2 months. I am also going to complete a PET scan looking for any objective findings to support Alzheimer's which could support increasing medication further. She agrees. 30 minutes of time was taken in total reviewing the medical record to include personal review of the MRI, face-to-face time, and time completing documentation today. I reviewed and decided to continue the current medications. This clinical note was dictated with an electronic dictation software that can make unintentional errors.   If there are any questions, please contact me directly for clarification. Thank you for giving me the opportunity to assist in the care of Ms. Taylor Gamboa. If you have questions, please do not hesitate to contact me.         Sincerely,      [address] [facility], DO  Neurologist  Brain Injury Medicine  Diplomate ABP[employer]

## 2022-03-21 NOTE — PROGRESS NOTES
Chief Complaint   Patient presents with    Follow-up     memory loss \"seems to be worsening, I got lost on the way here. \"     Visit Vitals  BP (!) 148/82 (BP 1 Location: Right upper arm, BP Patient Position: Sitting)   Pulse 94   Ht 5' 3\" (1.6 m)   Wt 112 lb (50.8 kg)   SpO2 100%   BMI 19.84 kg/m²

## 2022-04-21 ENCOUNTER — TRANSCRIBE ORDER (OUTPATIENT)
Dept: SCHEDULING | Age: 82
End: 2022-04-21

## 2022-04-21 DIAGNOSIS — Z12.31 ENCOUNTER FOR MAMMOGRAM TO ESTABLISH BASELINE MAMMOGRAM: Primary | ICD-10-CM

## 2022-04-22 ENCOUNTER — HOSPITAL ENCOUNTER (OUTPATIENT)
Dept: PET IMAGING | Age: 82
Discharge: HOME | End: 2022-04-22
Attending: PSYCHIATRY & NEUROLOGY
Payer: MEDICARE

## 2022-04-22 VITALS — BODY MASS INDEX: 21.26 KG/M2 | HEIGHT: 63 IN | WEIGHT: 120 LBS

## 2022-04-22 DIAGNOSIS — R41.3 MEMORY LOSS: ICD-10-CM

## 2022-04-22 LAB
GLUCOSE BLD STRIP.AUTO-MCNC: 119 MG/DL (ref 65–117)
SERVICE CMNT-IMP: ABNORMAL

## 2022-04-22 PROCEDURE — A9552 F18 FDG: HCPCS

## 2022-04-22 RX ORDER — FLUDEOXYGLUCOSE F-18 200 MCI/ML
10 INJECTION INTRAVENOUS ONCE
Status: COMPLETED | OUTPATIENT
Start: 2022-04-22 | End: 2022-04-22

## 2022-04-22 RX ADMIN — FLUDEOXYGLUCOSE F-18 9.88 MILLICURIE: 200 INJECTION INTRAVENOUS at 07:24

## 2022-04-22 NOTE — PROGRESS NOTES
Brain imaging looks good. No evidence to suggest clear Alzheimer's at this time based on radiographic testing.

## 2022-05-02 ENCOUNTER — OFFICE VISIT (OUTPATIENT)
Dept: NEUROLOGY | Age: 82
End: 2022-05-02
Payer: MEDICARE

## 2022-05-02 DIAGNOSIS — G31.84 MILD COGNITIVE IMPAIRMENT: Primary | ICD-10-CM

## 2022-05-02 DIAGNOSIS — F41.8 ANXIETY ABOUT HEALTH: ICD-10-CM

## 2022-05-02 PROCEDURE — 90791 PSYCH DIAGNOSTIC EVALUATION: CPT | Performed by: CLINICAL NEUROPSYCHOLOGIST

## 2022-05-02 NOTE — PROGRESS NOTES
[address] [facility],[address]  [address]   [address] [place] [address] [place] [address], [facility] [address]   [phone] Office   [phone] Fax      Neuropsychology    Initial Diagnostic Interview Note      Referral:  Petra Darling MD, Dr. Pop Jose is a 80 y.o. right handed   female who was accompanied by her spouse to the initial clinical interview on 5/2/22. Please refer to her medical records for details pertaining to her history. At the start of the appointment, I reviewed the patient's [facility] Epic Chart (including Media scanned in from previous providers) for the active Problem List, all pertinent Past Medical Hx, medications, recent radiologic and laboratory findings. In addition, I reviewed pt's documented Immunization Record and Encounter History. 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 No history of previously diagnosed LD and/or receipt of special education services. She has noticed a progressive decline in short term memory going on and worsening for 2-3 years. Saw Dr. Angeles Cason and at some point was started on Aricept (generic). She and her spouse were college classmates and then spouse went off to med school and she went to vet school until about 2001 and got together at a college reunion and the rest of his history. She repeats herself. Starts tasks and does not complete. She cannot remember names. She has more so short term than long term. She remains independent for medications, finances, day-to-day chores, ADLs. She drives without issue but is more cautious in terms of directions. No acute or focal issue.s No background known stroke, meningitis/encephalitis, RAUL Fever, Lupus, Lyme, TBI, etc.   No unusual headaches. No numbness or weakness. She has a hard time remember how to get some place, where she was.   All conversations that she has, she forgets. Sleeps well. Appetite is good. Mom lived to 80. Father at the age of 80. No known family history of cognitive concerns. Has been pretty housebound since the pandemic. She does work in the yard, and writes checks for donations. Has taken over several rooms with piles of paper related to her donations.       PET/CT SCAN     PROCEDURE: Following IV injection of 9.88 mCi 18 Fluoro 2 deoxyglucose (FDG) and  a standard uptake delay, PET imaging is performed of the brain and axial,  sagittal and coronal images were acquired. Unenhanced CT is obtained for  anatomic localization, and attenuation correction of the PET scan. Patient  preprocedure blood glucose level: 119 mg/dL.     CORRELATIVE IMAGING STUDIES: Brain MR 9/17/2021.     COMPARISON PET: None     HISTORY: The study is requested for evaluation of memory loss.      FINDINGS:     The examination demonstrate normal tracer distribution. No focal defect is  identified to suggest an underlying etiology for dementia.     IMPRESSION  Normal tracer distribution without evidence to suggest an underlying  etiology for dementia.         EXAM: MRI BRAIN WO CONT     TECHNIQUE: Brain images including sagittal and axial T1-weighted, axial FLAIR,  T2-weighted, diffusion weighted, gradient echo,  coronal T2     IV CONTRAST:  None     INDICATION:  [de-identified]year-old woman with progressive memory loss     COMPARISON:  None.     FINDINGS:  BRAIN PARENCHYMA:  Extensive confluent FLAIR/T2 hyperintensity in the cerebral  white matter. Chronic lacunar infarcts in the cerebellum. No acute or major  chronic infarct. No mass. INTRACRANIAL HEMORRHAGE: Few scattered foci of chronic microhemorrhage in the  cerebral hemispheres, one in each inferior parietal lobe and one in the right  superior parietal lobe. No major hematoma. No extra-axial collection. CSF SPACES:  Normal in size and morphology for the patient's age. BASAL CISTERNS:  Patent. MIDLINE SHIFT: None.   VASCULAR SYSTEM: Normal flow voids. PARANASAL SINUSES AND MASTOID AIR CELLS:  No significant opacification. VISUALIZED ORBITS:  No significant abnormalities. VISUALIZED UPPER CERVICAL SPINE:  No significant abnormalities. SELLA:  No enlargement or  focal abnormality. SKULL BASE:  No significant abnormalities. Cerebellar tonsils in normal  position.   CALVARIUM:  Intact.      IMPRESSION  Extensive cerebral white matter signal abnormality with multiple  chronic cerebellar lacunar infarcts and few foci of chronic microhemorrhage in  the parietal lobes, most consistent with chronic small vessel.     Review of Systems     Neuropsychological Mental Status Exam (NMSE):      Historian: Good  Praxis: No UE apraxia  R/L Orientation: Intact to self and to other  Dress: within normal limits   Weight: within normal limits   Appearance/Hygiene: within normal limits   Gait: within normal limits   Assistive Devices:Glasses  Mood: within normal limits   Affect: within normal limits   Comprehension: within normal limits   Thought Process: within normal limits   Expressive Language: within normal limits   Receptive Language: within normal limits   Motor:  No cognitive or motor perseveration  ETOH: very rarely  Tobacco: Denied  Illicit: Denied  SI/HI: Denied  Psychosis: Denied  Insight: Within normal limits  Judgment: Within normal limits  Other Psych:      Past Medical History:   Diagnosis Date    Arthritis     osteo    Cancer (HCC)     uterine - surgery    Chronic pain     arthritis    GERD (gastroesophageal reflux disease)     Nausea & vomiting     sensitive to anesthesia and pain procedure    Thyroid disease     benign thyroid polyps       Past Surgical History:   Procedure Laterality Date    HX HYSTERECTOMY      total    HX ORTHOPAEDIC      lumbar laminectomy x 2    HX ORTHOPAEDIC      surgery on foot joint to clean out arthritis    HX ORTHOPAEDIC Left     partial joint replacement big toe    HX TONSILLECTOMY Allergies   Allergen Reactions    Other Medication Anaphylaxis     Allergic to horse serum.  Shellfish Derived Nausea and Vomiting     Allergic to certain clams and mussels.  Tetracycline Other (comments)     Allergic as child, but unsure what. Has taken since without reaction. Family History   Problem Relation Age of Onset    Parkinsonism Mother     Heart Failure Father         CHF    Heart Disease Father        Social History     Tobacco Use    Smoking status: Never Smoker    Smokeless tobacco: Never Used   Substance Use Topics    Alcohol use: Yes     Comment: \"small amount\"    Drug use: No       Current Outpatient Medications   Medication Sig Dispense Refill    Lactobacillus acidophilus 10 billion cell cap  (Patient not taking: Reported on 3/21/2022)      omega-3 fatty acids-fish oil (Fish OiL) 360-1,200 mg cap       diclofenac (Voltaren Arthritis Pain) 1 % gel apply to affected area 4 times a day      aspirin 81 mg chewable tablet Take 81 mg by mouth daily.  cyanocobalamin/folic acid (vitamin T43-UIDCA acid) 6,860-136 mcg lozg       ipratropium (ATROVENT) 21 mcg (0.03 %) nasal spray 2 Sprays by Nasal route every twelve (12) hours.  OTHER Mature Multi Vitamins & Minerals      donepeziL (ARICEPT) 5 mg tablet Take 1 Tablet by mouth daily (with breakfast). 90 Tablet 2    celecoxib (CELEBREX) 200 mg capsule Take 200 mg by mouth daily.  Creon 36,000-114,000- 180,000 unit cpDR capsule       lactase (LACTAID) 3,000 unit tablet Take 3,000 Units by mouth.  dextran 70-hypromellose (ARTIFICIAL TEARS) ophthalmic solution Administer  to both eyes as needed. (Patient not taking: Reported on 9/13/2021)      naproxen sodium (NAPROSYN) 220 mg tablet Take 220 mg by mouth two (2) times daily as needed.  CALCIUM CARBONATE/VITAMIN D3 (CALCIUM 600 + D,3, PO) Take 600 mg by mouth two (2) times a day.  2 1/2 tabs per day      DOCOSAHEXANOIC ACID/EPA (FISH OIL PO) Take 1,200 mg by mouth daily. (Patient not taking: Reported on 9/13/2021)      CINNAMON BARK (CINNAMON PO) Take 1,000 mg by mouth daily.  LACTOBACILLUS RHAMNOSUS GG (PROBIOTIC PO) Take  by mouth. Takes one po daily.  metroNIDAZOLE (NORITATE) 1 % topical cream Apply  to affected area daily. Use a thin layer to affected areas after washing      OTHER cliradex (towlettes) to clean eyelids twice daily. (Patient not taking: Reported on 9/13/2021)           Plan:  Obtain authorization for testing from insurance company. Report to follow once testing, scoring, and interpretation completed. ? Organic based neurocognitive issues versus mood disorder or combination of same. ? Problems organic, functional, or both? This note will not be viewable in [address] No

## 2022-05-16 ENCOUNTER — HOSPITAL ENCOUNTER (OUTPATIENT)
Dept: MAMMOGRAPHY | Age: 82
Discharge: HOME | End: 2022-05-16
Attending: INTERNAL MEDICINE
Payer: MEDICARE

## 2022-05-16 DIAGNOSIS — Z12.31 ENCOUNTER FOR MAMMOGRAM TO ESTABLISH BASELINE MAMMOGRAM: ICD-10-CM

## 2022-05-16 PROCEDURE — 77063 BREAST TOMOSYNTHESIS BI: CPT

## 2022-06-03 ENCOUNTER — OFFICE VISIT (OUTPATIENT)
Dept: NEUROLOGY | Age: 82
End: 2022-06-03

## 2022-06-03 DIAGNOSIS — Z91.199 NO-SHOW FOR APPOINTMENT: Primary | ICD-10-CM

## 2022-06-17 ENCOUNTER — OFFICE VISIT (OUTPATIENT)
Dept: NEUROLOGY | Age: 82
End: 2022-06-17
Payer: MEDICARE

## 2022-06-17 DIAGNOSIS — G30.1 LATE ONSET ALZHEIMER'S DEMENTIA WITHOUT BEHAVIORAL DISTURBANCE (HCC): Primary | ICD-10-CM

## 2022-06-17 DIAGNOSIS — F02.80 LATE ONSET ALZHEIMER'S DEMENTIA WITHOUT BEHAVIORAL DISTURBANCE (HCC): Primary | ICD-10-CM

## 2022-06-17 PROCEDURE — 96133 NRPSYC TST EVAL PHYS/QHP EA: CPT | Performed by: CLINICAL NEUROPSYCHOLOGIST

## 2022-06-17 PROCEDURE — 96136 PSYCL/NRPSYC TST PHY/QHP 1ST: CPT | Performed by: CLINICAL NEUROPSYCHOLOGIST

## 2022-06-17 PROCEDURE — 96139 PSYCL/NRPSYC TST TECH EA: CPT | Performed by: CLINICAL NEUROPSYCHOLOGIST

## 2022-06-17 PROCEDURE — 96137 PSYCL/NRPSYC TST PHY/QHP EA: CPT | Performed by: CLINICAL NEUROPSYCHOLOGIST

## 2022-06-17 PROCEDURE — 96132 NRPSYC TST EVAL PHYS/QHP 1ST: CPT | Performed by: CLINICAL NEUROPSYCHOLOGIST

## 2022-06-17 PROCEDURE — 96138 PSYCL/NRPSYC TECH 1ST: CPT | Performed by: CLINICAL NEUROPSYCHOLOGIST

## 2022-06-17 NOTE — LETTER
6/30/2022    Patient: Danica Wolf   YOB: 1940   Date of Visit: 6/17/2022     Mikki Goyal MD  [address] [place] [id number]  Via Fax: [phone]    Dear Mikki Goyal MD,      Thank you for referring Ms. Christopher Blanton to [facility] for evaluation. My notes for this consultation are attached. If you have questions, please do not hesitate to call me. I look forward to following your patient along with you.       Sincerely,    Augie Pepper PsyD

## 2022-06-30 NOTE — PROGRESS NOTES
[address] [facility],5[address]  [address]   [place] [address] [place] [address] Counts include 234 beds at the [facility]    [phone] Office   [phone] Fax      Neuropsychological Evaluation Report    Referral:  Maddie Dunbar MD,  Robin  is a 80 y.o. right handed   female who was accompanied by her spouse to the initial clinical interview on 5/2/22. Please refer to her medical records for details pertaining to her history. At the start of the appointment, I reviewed the patient's [facility] Epic Chart (including Media scanned in from previous providers) for the active Problem List, all pertinent Past Medical Hx, medications, recent radiologic and laboratory findings. In addition, I reviewed pt's documented Immunization Record and Encounter History. 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 No history of previously diagnosed LD and/or receipt of special education services. She has noticed a progressive decline in short term memory going on and worsening for 2-3 years. Saw Dr. Taylor Rob and at some point was started on Aricept (generic). She and her spouse were college classmates and then spouse went off to med school and she went to vet school until about 2001 and got together at a college reunion and the rest of his history. She repeats herself. Starts tasks and does not complete. She cannot remember names. She has more so short term than long term. She remains independent for medications, finances, day-to-day chores, ADLs. She drives without issue but is more cautious in terms of directions. No acute or focal issue.s No background known stroke, meningitis/encephalitis, RAUL Fever, Lupus, Lyme, TBI, etc.   No unusual headaches. No numbness or weakness. She has a hard time remember how to get some place, where she was. All conversations that she has, she forgets.    Sleeps well.  Appetite is good. Mom lived to 80. Father at the age of 80. No known family history of cognitive concerns. Has been pretty housebound since the pandemic. She does work in the yard, and writes checks for donations. Has taken over several rooms with piles of paper related to her donations.       PET/CT SCAN     PROCEDURE: Following IV injection of 9.88 mCi 18 Fluoro 2 deoxyglucose (FDG) and  a standard uptake delay, PET imaging is performed of the brain and axial,  sagittal and coronal images were acquired. Unenhanced CT is obtained for  anatomic localization, and attenuation correction of the PET scan. Patient  preprocedure blood glucose level: 119 mg/dL.     CORRELATIVE IMAGING STUDIES: Brain MR 9/17/2021.     COMPARISON PET: None     HISTORY: The study is requested for evaluation of memory loss.      FINDINGS:     The examination demonstrate normal tracer distribution. No focal defect is  identified to suggest an underlying etiology for dementia.     IMPRESSION  Normal tracer distribution without evidence to suggest an underlying  etiology for dementia.         EXAM: MRI BRAIN WO CONT     TECHNIQUE: Brain images including sagittal and axial T1-weighted, axial FLAIR,  T2-weighted, diffusion weighted, gradient echo,  coronal T2     IV CONTRAST:  None     INDICATION:  [de-identified]year-old woman with progressive memory loss     COMPARISON:  None.     FINDINGS:  BRAIN PARENCHYMA:  Extensive confluent FLAIR/T2 hyperintensity in the cerebral  white matter. Chronic lacunar infarcts in the cerebellum. No acute or major  chronic infarct. No mass. INTRACRANIAL HEMORRHAGE: Few scattered foci of chronic microhemorrhage in the  cerebral hemispheres, one in each inferior parietal lobe and one in the right  superior parietal lobe. No major hematoma. No extra-axial collection. CSF SPACES:  Normal in size and morphology for the patient's age. BASAL CISTERNS:  Patent. MIDLINE SHIFT: None.   VASCULAR SYSTEM:  Normal flow voids.   PARANASAL SINUSES AND MASTOID AIR CELLS:  No significant opacification. VISUALIZED ORBITS:  No significant abnormalities. VISUALIZED UPPER CERVICAL SPINE:  No significant abnormalities. SELLA:  No enlargement or  focal abnormality. SKULL BASE:  No significant abnormalities. Cerebellar tonsils in normal  position.   CALVARIUM:  Intact.      IMPRESSION  Extensive cerebral white matter signal abnormality with multiple  chronic cerebellar lacunar infarcts and few foci of chronic microhemorrhage in  the parietal lobes, most consistent with chronic small vessel.     Review of Systems     Neuropsychological Mental Status Exam (NMSE):      Historian: Good  Praxis: No UE apraxia  R/L Orientation: Intact to self and to other  Dress: within normal limits   Weight: within normal limits   Appearance/Hygiene: within normal limits   Gait: within normal limits   Assistive Devices:Glasses  Mood: within normal limits   Affect: within normal limits   Comprehension: within normal limits   Thought Process: within normal limits   Expressive Language: within normal limits   Receptive Language: within normal limits   Motor:  No cognitive or motor perseveration  ETOH: very rarely  Tobacco: Denied  Illicit: Denied  SI/HI: Denied  Psychosis: Denied  Insight: Within normal limits  Judgment: Within normal limits  Other Psych:      Past Medical History:   Diagnosis Date    Arthritis     osteo    Cancer (HCC)     uterine - surgery    Chronic pain     arthritis    GERD (gastroesophageal reflux disease)     Nausea & vomiting     sensitive to anesthesia and pain procedure    Thyroid disease     benign thyroid polyps       Past Surgical History:   Procedure Laterality Date    HX HYSTERECTOMY      total    HX ORTHOPAEDIC      lumbar laminectomy x 2    HX ORTHOPAEDIC      surgery on foot joint to clean out arthritis    HX ORTHOPAEDIC Left     partial joint replacement big toe    HX TONSILLECTOMY         Allergies Allergen Reactions    Other Medication Anaphylaxis     Allergic to horse serum.  Shellfish Derived Nausea and Vomiting     Allergic to certain clams and mussels.  Tetracycline Other (comments)     Allergic as child, but unsure what. Has taken since without reaction. Family History   Problem Relation Age of Onset    Parkinsonism Mother     Heart Failure Father         CHF    Heart Disease Father        Social History     Tobacco Use    Smoking status: Never Smoker    Smokeless tobacco: Never Used   Substance Use Topics    Alcohol use: Yes     Comment: \"small amount\"    Drug use: No       Current Outpatient Medications   Medication Sig Dispense Refill    Lactobacillus acidophilus 10 billion cell cap  (Patient not taking: Reported on 3/21/2022)      omega-3 fatty acids-fish oil (Fish OiL) 360-1,200 mg cap       diclofenac (Voltaren Arthritis Pain) 1 % gel apply to affected area 4 times a day      aspirin 81 mg chewable tablet Take 81 mg by mouth daily.  cyanocobalamin/folic acid (vitamin A85-GYXLF acid) 1,419-202 mcg lozg       ipratropium (ATROVENT) 21 mcg (0.03 %) nasal spray 2 Sprays by Nasal route every twelve (12) hours.  OTHER Mature Multi Vitamins & Minerals      donepeziL (ARICEPT) 5 mg tablet Take 1 Tablet by mouth daily (with breakfast). 90 Tablet 2    celecoxib (CELEBREX) 200 mg capsule Take 200 mg by mouth daily.  Creon 36,000-114,000- 180,000 unit cpDR capsule       lactase (LACTAID) 3,000 unit tablet Take 3,000 Units by mouth.  dextran 70-hypromellose (ARTIFICIAL TEARS) ophthalmic solution Administer  to both eyes as needed. (Patient not taking: Reported on 9/13/2021)      naproxen sodium (NAPROSYN) 220 mg tablet Take 220 mg by mouth two (2) times daily as needed.  CALCIUM CARBONATE/VITAMIN D3 (CALCIUM 600 + D,3, PO) Take 600 mg by mouth two (2) times a day. 2 1/2 tabs per day      DOCOSAHEXANOIC ACID/EPA (FISH OIL PO) Take 1,200 mg by mouth daily. (Patient not taking: Reported on 9/13/2021)      CINNAMON BARK (CINNAMON PO) Take 1,000 mg by mouth daily.  LACTOBACILLUS RHAMNOSUS GG (PROBIOTIC PO) Take  by mouth. Takes one po daily.  metroNIDAZOLE (NORITATE) 1 % topical cream Apply  to affected area daily. Use a thin layer to affected areas after washing      OTHER cliradex (towlettes) to clean eyelids twice daily. (Patient not taking: Reported on 9/13/2021)           Plan:  Obtain authorization for testing from insurance company. Report to follow once testing, scoring, and interpretation completed. ? Organic based neurocognitive issues versus mood disorder or combination of same. ? Problems organic, functional, or both? This note will not be viewable in [address] Neuropsychological Test Results  Patient Testing 6/3/22 and 6/17/22 Report Completed 6/30/22  A Psychometrist Assisted w/ portions of this evaluation while under my direct  supervision    The following evaluation procedures/tests were administered:      Neuropsychologist Performed, Interpreted, & Reported:  Neuropsychological Mental Status Exam, Revised Memory & Behavior Checklist,  Mini Mental Status Exam, Clock Drawing Test, Andrew-Melzack Pain Questionnaire, Test Of Premorbid Functioning, History Taking  & Clinical Interview With The Patient, Additional History Taking w/ the Patient's Spouse, CASE, ABAS-3, NII, CPT-III, Review Of Available Records. Psychometrist Administered under Neuropsychologist Supervision & Neuropsychologist Interpreted & Neuropsychologist Reported:  Verbal Fluency Tests, Baljeet & Baljeet - Revised, Trailmaking Test Parts A & B, Wechsler Adult Intelligence Scale - IV, [place] Verbal Learning Test - 3, Grooved Pegboard, Buenrostro Depression Inventory - II, Buenrostro Anxiety Inventory.    Test Findings:  Test Findings:  Note:  The patients raw data have been compared with currently available norms which include demographic corrections for age, gender, and/or education. Sometimes, the patients scores are compared to demographically similar individuals as close to the patients age, education level, etc., as possible. \"Average\" is viewed as being +/- 1 standard deviation (SD) from the stated mean for a particular test score. \"Low average\" is viewed as being between 1 and 2 SD below the mean, and above average is viewed as being 1 and 2 SD above the mean. Scores falling in the borderline range (between 1-1/2 and 2 SD below the mean) are viewed with particular attention as to whether they are normal or abnormal neurocognitive test scores. Other methods of inference in analyzing the test data are also utilized, including the pattern and range of scores in the profile, bilateral motor functions, and the presence, if any, of pathognomonic signs. Behaviorally, the patient was friendly and cooperative and appeared motivated to perform well during this examination. Within this context, the results of this evaluation are viewed as a valid reflection of the patients actual neurocognitive and emotional status. The patient's score of 23/30 on the Mini-Mental Status Exam was impaired. In this regard, she was not oriented to date or county. Recall for three words after a brief delay was 0/3 correct. Repetition was impaired. Comprehension and carry out of a three step command was 2/3 correct. Clock drawing was impaired. Her structured word list fluency, as assessed by the FAS Test, was within the moderately impaired range with a T score of 27. Category fluency was within the moderately to severely impaired range with a T score of 23. Confrontation naming ability, as assessed by the Baljeet & Baljeet - Revised, was within the severely impaired range at 14/60 correct (T = 19). This pattern of performance is indicative of a patient who is at increased risk for day-to-day problems with verbal fluency and confrontation naming.          The patient was administered the [facility] Continuous Performance Test - III and review of the subscales within this instrument revealed numerous concerns for inattentiveness without impulsivity. This pattern of performance is indicative of a patient who is at increased risk for day-to-day problems with sustained visual attention/concentration. The patient is not showing problems with working memory capacity (30th %ile) or processing speed (30th %ile) on the WAIS-IV. Her Verbal Comprehension Index score of 89 was low average. Her Perceptual Reasoning Index score of 98 was average. These scores do not reflect a decline in functioning based on an assessment of premorbid functioning. The patient was administered the [place] Verbal Learning Test  - 3 and generated an impaired range (and positive) learning curve over five repeated auditory word list learning trials. An interference trial was impaired. Free and cued, short and long delayed recall were all impaired. Recognition and forced choice recall were impaired. This pattern of performance is indicative of a patient who is at increased risk for day-to-day problems with auditory learning and/or memory. Simple timed visual motor sequencing (Trailmaking Test Part A) was within the below average range with a T score of 40. Her performance on a similar, but more complex task of timed visual motor sequencing (Trailmaking Test Part B) was within the severely  impaired range with a T score of 16. This latter test was discontinued. On additional assessment of executive functioning [facility]), the patient completed 1/6 categories on this test.  This pattern of performance is indicative of a patient who is at increased risk for day-to-day problems with executive functioning. Fine motor dexterity was within the normal range bilaterally. This does not raise concern for a particularly lateralized brain dysfunction.        The patient rated her current level of pain as \"0/5- No Pain\" on the Andrew-Melzack Pain Questionnaire. She reported pain in her neck, thumbs, knees, and feet. Her Buenrostro Depression Inventory -II score of 4 was within the minimally depressed range. Her Buenrostro Anxiety Inventory score of 5 reflected minimal anxiety. The patient's responses on the Personality Assessment Inventory were deemed valid for interpretation, though some defensiveness in responding is noted. Within this context, this is a normal range personality profile. The spouse completed the ABAS-3 and did not report clinically significant concerns regarding the patient's general adaptive skills (32nd %ile), conceptual skills (30th %ile), social skills (27th  %ile), or practical skills (37th %ile). On the CASE, the spouse reported clinically significant concerns regarding the patient's cognitive functioning. Impressions & Recommendations: This is an abnormal range Neuropsychological Evaluation with respect to neurocognitive functioning. In this regard, she is showing impairments with mental status, verbal fluency, confrontation naming, visual attention, auditory learning, auditory memory,  and executive functioning. Casual language skills and intellectual capacity are important strengths which will serve to mask underlying cognitive deficits at times. From an emotional standpoint, there is no major report of psychopathology. In my opinion, this appears to be a case of mild to moderate dementia. Alzheimer's is likely. I suggest consideration for medication for memory and attention if this/these are not medically contraindicated. Emotional status needs to be monitored over time. She should be encouraged to remain as mentally, socially, and physically active as possible. I do not find her competent and a POA should be established if this has not been done so already. She also likely requires supervision for those domains pertaining to memory.   This includes medication management supervision and supervision of financial dealings. Formal driving safety eval.   The family is supportive and so long as they are able to assist with the supervision as noted, I agree with her current living arrangement. Baseline now established. Follow up yearly, or prn. Clinical correlation is, of course, indicated. I will discuss these findings with the patient when she follows up with me in the near future. A follow up Neuropsychological Evaluation is indicated on a prn basis, especially if there are any cognitive and/or emotional changes. DIAGNOSES:  Dementia - Mild To Moderate     The above information is based upon information currently available to me. If there is any additional information of which I am currently unaware, I would be more than happy to review it upon having it made available to me. Thank you for the opportunity to see this interesting individual.     Sincerely,       Glory Ricci. Mary Pal, David Lim MD    Time Documentation:    16654*8 01765*4 (60 minutes)    62645 x 1  96139 x 5 Test Administration/Data Gathering By Technician: (3 hours). 45817 x 1 (first 30 minutes), 77331 x 5 (each additional 30 minutes)    96132 x 1  96133 x 1 Testing Evaluation Services by Neuropsychologist (1 hour, 50 minutes) 96132 x 1 (first hour), 96133 x 1 (50 minutes)    Definitions:      49684/66693:  Neurobehavioral Status Exam, Clinical interview. Clinical assessment of thinking, reasoning and judgment, by neuropsychologist, both face to face time with patient and time interpreting those test results and reporting, first and subsequent hours)    30766/48916: Neuropsychological Test Administration by Technician/Psychometrist, first 30 minutes and each additional 30 minutes. The above includes: Record review. Review of history provided by patient. Review of collaborative information. Testing by Clinician. Review of raw data. Scoring. Report writing of individual tests administered by Clinician. Integration of individual tests administered by psychometrist with NSE/testing by clinician, review of records/history/collaborative information, case Conceptualization, treatment planning, clinical decision making, report writing, coordination Of Care. Psychometry test codes as time spent by psychometrist administering and scoring neurocognitive/psychological tests under supervision of neuropsychologist.  Integral services including scoring of raw data, data interpretation, case conceptualization, report writing etcetera were initiated after the patient finished testing/raw data collected and was completed on the date the report was signed. Please note that this dictation was completed with [employer], the [employer] voice recognition software. Quite often unanticipated grammatical, syntax, homophones, and other interpretive errors are inadvertently transcribed by the computer software. Please disregard these errors. Please excuse any errors that have escaped final proofreading. Thank you.

## 2022-08-16 ENCOUNTER — OFFICE VISIT (OUTPATIENT)
Dept: NEUROLOGY | Age: 82
End: 2022-08-16
Payer: MEDICARE

## 2022-08-16 DIAGNOSIS — F02.80 LATE ONSET ALZHEIMER'S DEMENTIA WITHOUT BEHAVIORAL DISTURBANCE (HCC): Primary | ICD-10-CM

## 2022-08-16 DIAGNOSIS — G30.1 LATE ONSET ALZHEIMER'S DEMENTIA WITHOUT BEHAVIORAL DISTURBANCE (HCC): Primary | ICD-10-CM

## 2022-08-16 DIAGNOSIS — F41.8 ANXIETY ABOUT HEALTH: ICD-10-CM

## 2022-08-16 PROCEDURE — 1123F ACP DISCUSS/DSCN MKR DOCD: CPT | Performed by: CLINICAL NEUROPSYCHOLOGIST

## 2022-08-16 PROCEDURE — 90832 PSYTX W PT 30 MINUTES: CPT | Performed by: CLINICAL NEUROPSYCHOLOGIST

## 2022-08-16 NOTE — PROGRESS NOTES
Prior to seeing the patient I reviewed the records, including the previously completed report, the records in [place], and any updated visits from other providers since I saw the patient last.      Today, I engaged in a psychoeducational and supportive and cognitive/behavioral psychotherapy session with the patient and spouse. I provided psychotherapy in the form of psychoeducation and support with respect to the results of the recent Neuropsychological Evaluation, including discussing individual tests as well as patient's areas of neurocognitive strength versus weakness. We discussed, in detail, the following: This is an abnormal range Neuropsychological Evaluation with respect to neurocognitive functioning. In this regard, she is showing impairments with mental status, verbal fluency, confrontation naming, visual attention, auditory learning, auditory memory,  and executive functioning. Casual language skills and intellectual capacity are important strengths which will serve to mask underlying cognitive deficits at times. From an emotional standpoint, there is no major report of psychopathology. In my opinion, this appears to be a case of mild to moderate dementia. Alzheimer's is likely. I suggest consideration for medication for memory and attention if this/these are not medically contraindicated. Emotional status needs to be monitored over time. She should be encouraged to remain as mentally, socially, and physically active as possible. I do not find her competent and a POA should be established if this has not been done so already. She also likely requires supervision for those domains pertaining to memory. This includes medication management supervision and supervision of financial dealings. Formal driving safety eval.   The family is supportive and so long as they are able to assist with the supervision as noted, I agree with her current living arrangement.   Baseline now established. Follow up yearly, or prn. Clinical correlation is, of course, indicated. I will discuss these findings with the patient when she follows up with me in the near future. A follow up Neuropsychological Evaluation is indicated on a prn basis, especially if there are any cognitive and/or emotional changes. DIAGNOSES:             Dementia - Mild To Moderate       Education was provided regarding my diagnostic impressions, and we discussed treatment plan/options. I also answered numerous questions related to the clinical findings, including discussing various methods to improve cognition and mood. Counseling provided regarding mood and cognition. CBT and supportive psychotherapy techniques were utilized. Supportive/Cognitive Behavioral/Solution Focused psychotherapy provided  Discussed rational versus irrational thinking patterns and their consequences. Discussed healthy/adaptive and unhealthy/maladaptive coping. The patient needs to follow with pcp, neurology as soon as she is able. Spouse needs to help her more with meds. She is over spending on credit cards so he should set a cap on that and use third party to monitor and to stop that. Stop political donations and such and it is an obsession. Every email from a political party and he needs to oversee that.      The patient had the following concerns which I deferred to their referring provider: meds for mood/cognition      Time spent today: 20

## 2022-08-19 ENCOUNTER — OFFICE VISIT (OUTPATIENT)
Dept: NEUROLOGY | Age: 82
End: 2022-08-19
Payer: MEDICARE

## 2022-08-19 VITALS
BODY MASS INDEX: 20.73 KG/M2 | SYSTOLIC BLOOD PRESSURE: 118 MMHG | HEIGHT: 63 IN | DIASTOLIC BLOOD PRESSURE: 62 MMHG | WEIGHT: 117 LBS | HEART RATE: 64 BPM | OXYGEN SATURATION: 99 % | RESPIRATION RATE: 16 BRPM

## 2022-08-19 DIAGNOSIS — G30.1 LATE ONSET ALZHEIMER'S DEMENTIA WITHOUT BEHAVIORAL DISTURBANCE (HCC): Primary | ICD-10-CM

## 2022-08-19 DIAGNOSIS — F02.80 LATE ONSET ALZHEIMER'S DEMENTIA WITHOUT BEHAVIORAL DISTURBANCE (HCC): Primary | ICD-10-CM

## 2022-08-19 PROCEDURE — G8427 DOCREV CUR MEDS BY ELIG CLIN: HCPCS | Performed by: PSYCHIATRY & NEUROLOGY

## 2022-08-19 PROCEDURE — 1090F PRES/ABSN URINE INCON ASSESS: CPT | Performed by: PSYCHIATRY & NEUROLOGY

## 2022-08-19 PROCEDURE — 1101F PT FALLS ASSESS-DOCD LE1/YR: CPT | Performed by: PSYCHIATRY & NEUROLOGY

## 2022-08-19 PROCEDURE — G8400 PT W/DXA NO RESULTS DOC: HCPCS | Performed by: PSYCHIATRY & NEUROLOGY

## 2022-08-19 PROCEDURE — G8510 SCR DEP NEG, NO PLAN REQD: HCPCS | Performed by: PSYCHIATRY & NEUROLOGY

## 2022-08-19 PROCEDURE — G8536 NO DOC ELDER MAL SCRN: HCPCS | Performed by: PSYCHIATRY & NEUROLOGY

## 2022-08-19 PROCEDURE — 99214 OFFICE O/P EST MOD 30 MIN: CPT | Performed by: PSYCHIATRY & NEUROLOGY

## 2022-08-19 PROCEDURE — 1123F ACP DISCUSS/DSCN MKR DOCD: CPT | Performed by: PSYCHIATRY & NEUROLOGY

## 2022-08-19 PROCEDURE — G8420 CALC BMI NORM PARAMETERS: HCPCS | Performed by: PSYCHIATRY & NEUROLOGY

## 2022-08-19 RX ORDER — DONEPEZIL HYDROCHLORIDE 10 MG/1
10 TABLET, FILM COATED ORAL
Qty: 90 TABLET | Refills: 2 | Status: SHIPPED | OUTPATIENT
Start: 2022-08-19

## 2022-08-19 NOTE — PROGRESS NOTES
Chief Complaint   Patient presents with    Follow-up     Memory loss: Patient reports the feeling the same since the last visit. HPI    26-year-old woman following up on memory changes. She saw neuropsychology with the results below. She has mild to moderate dementia. She is here today with her . It sounds like she was not taking donepezil for quite some time unknown and only in the last couple weeks as she resumed the medication. She is not cooking as much. She is not driving now. Her  manages the medications. Her mood is a little bit better since being back on Aricept. Not very busy. Not motivated to do any exercise. Sleeping well and has a good appetite. PET scan negative. NEUROPSY 2022: This is an abnormal range Neuropsychological Evaluation with respect to neurocognitive functioning. In this regard, she is showing impairments with mental status, verbal fluency, confrontation naming, visual attention, auditory learning, auditory memory,  and executive functioning. Casual language skills and intellectual capacity are important strengths which will serve to mask underlying cognitive deficits at times. From an emotional standpoint, there is no major report of psychopathology. In my opinion, this appears to be a case of mild to moderate dementia. Alzheimer's is likely. I suggest consideration for medication for memory and attention if this/these are not medically contraindicated. Emotional status needs to be monitored over time. She should be encouraged to remain as mentally, socially, and physically active as possible. I do not find her competent and a POA should be established if this has not been done so already. She also likely requires supervision for those domains pertaining to memory. This includes medication management supervision and supervision of financial dealings.  Formal driving safety eval.   The family is supportive and so long as they are able to assist with the supervision as noted, I agree with her current living arrangement. Baseline now established. Follow up yearly, or prn. Clinical correlation is, of course, indicated. I will discuss these findings with the patient when she follows up with me in the near future. A follow up Neuropsychological Evaluation is indicated on a prn basis, especially if there are any cognitive and/or emotional changes. 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 She is here with her , Roni Osorio, to discuss these new symptoms. She tells me she was seen by a neurologist in the community, Dr. Cristopher Abraham, at some point in time and was started on donepezil. She thinks she has been on donepezil maybe 18 months but she could not recall what testing she has had done for this. She thinks her memory has not been so good for about 5 years mainly with issues remembering short-term situations like names and conversations. She might repeat her questions quickly. When she drives, she is not getting lost but she does take a little bit longer and has to be more careful. Her ADLs are all intact such as dressing bathing and toileting. She does not miss her medications as long as she has her system in place. Sleep is a little bit inconsistent she thinks since she has been taking donepezil at bedtime. She is prediabetic and is very careful about her diet. No unusual headaches. No numbness or weakness. She used to have a history of thyroid dysfunction but no longer takes medication but is unclear of the state of her thyroid. Review of Systems   Unable to perform ROS: Dementia   Psychiatric/Behavioral:  Positive for memory loss. The patient is nervous/anxious. All other systems reviewed and are negative.     Past Medical History:   Diagnosis Date Arthritis     osteo    Cancer (HCC)     uterine - surgery    Chronic pain     arthritis    GERD (gastroesophageal reflux disease)     Nausea & vomiting     sensitive to anesthesia and pain procedure    Thyroid disease     benign thyroid polyps     Family History   Problem Relation Age of Onset    Parkinsonism Mother     Heart Failure Father         CHF    Heart Disease Father      Social History     Socioeconomic History    Marital status:      Spouse name: Not on file    Number of children: Not on file    Years of education: Not on file    Highest education level: Not on file   Occupational History    Not on file   Tobacco Use    Smoking status: Never    Smokeless tobacco: Never   Vaping Use    Vaping Use: Never used   Substance and Sexual Activity    Alcohol use: Yes     Comment: \"small amount\"    Drug use: No    Sexual activity: Not Currently   Other Topics Concern    Not on file   Social History Narrative    Not on file     Social Determinants of Health     Financial Resource Strain: Not on file   Food Insecurity: Not on file   Transportation Needs: Not on file   Physical Activity: Not on file   Stress: Not on file   Social Connections: Not on file   Intimate Partner Violence: Not on file   Housing Stability: Not on file     Allergies   Allergen Reactions    Other Medication Anaphylaxis     Allergic to horse serum. Shellfish Derived Nausea and Vomiting     Allergic to certain clams and mussels. Tetracycline Other (comments)     Allergic as child, but unsure what. Has taken since without reaction. Current Outpatient Medications   Medication Sig    amino acids (L-SERINE PO) Take 500 mg by mouth once. donepeziL (ARICEPT) 10 mg tablet Take 1 Tablet by mouth daily (with breakfast). diclofenac (VOLTAREN) 1 % gel apply to affected area 4 times a day    ipratropium (ATROVENT) 21 mcg (0.03 %) nasal spray 2 Sprays by Nasal route every twelve (12) hours.     OTHER Mature Multi Vitamins & Minerals Creon 36,000-114,000- 180,000 unit cpDR capsule     lactase (LACTAID) 3,000 unit tablet Take 3,000 Units by mouth. naproxen sodium (NAPROSYN) 220 mg tablet Take 220 mg by mouth two (2) times daily as needed. Lactobacillus acidophilus 10 billion cell cap  (Patient not taking: No sig reported)    omega-3 fatty acids-fish oil 360-1,200 mg cap  (Patient not taking: Reported on 8/19/2022)    aspirin 81 mg chewable tablet Take 81 mg by mouth daily. (Patient not taking: Reported on 8/19/2022)    cyanocobalamin/folic acid (vitamin H86-VQWAX acid) 1,256-278 mcg lozg  (Patient not taking: Reported on 8/19/2022)    celecoxib (CELEBREX) 200 mg capsule Take 200 mg by mouth daily. (Patient not taking: Reported on 8/19/2022)    dextran 70-hypromellose (ARTIFICIAL TEARS) ophthalmic solution Administer  to both eyes as needed. (Patient not taking: No sig reported)    CALCIUM CARBONATE/VITAMIN D3 (CALCIUM 600 + D,3, PO) Take 600 mg by mouth two (2) times a day. 2 1/2 tabs per day (Patient not taking: Reported on 8/19/2022)    DOCOSAHEXANOIC ACID/EPA (FISH OIL PO) Take 1,200 mg by mouth daily. (Patient not taking: No sig reported)    CINNAMON BARK (CINNAMON PO) Take 1,000 mg by mouth daily. (Patient not taking: Reported on 8/19/2022)    LACTOBACILLUS RHAMNOSUS GG (PROBIOTIC PO) Take  by mouth. Takes one po daily. (Patient not taking: Reported on 8/19/2022)    metroNIDAZOLE (NORITATE) 1 % topical cream Apply  to affected area daily. Use a thin layer to affected areas after washing (Patient not taking: Reported on 8/19/2022)    OTHER cliradex (towlettes) to clean eyelids twice daily. (Patient not taking: No sig reported)     No current facility-administered medications for this visit. Neurologic Exam     Mental Status   WD/WN adult in NAD, normal grooming  VSS  A&O x 3, she knows the month date and year. She can spell ocean forward and backward. Pleasant and soft-spoken.     PERRL, nonicteric  Face is covered  Speech is clear  No limb ataxia. No abnl movements. Moving all extemities spontaneously and symmetric  Def  gait         Visit Vitals  /62 (BP 1 Location: Left upper arm, BP Patient Position: Sitting)   Pulse 64   Resp 16   Ht 5' 3\" (1.6 m)   Wt 117 lb (53.1 kg)   SpO2 99%   BMI 20.73 kg/m²       Assessment and Plan   Diagnoses and all orders for this visit:    1. Late onset Alzheimer's dementia without behavioral disturbance (HCC)  -     donepeziL (ARICEPT) 10 mg tablet; Take 1 Tablet by mouth daily (with breakfast). 66-year-old woman who has neuropsych testing indicative of Alzheimer's. I am going to have her remain on donepezil but we will titrate to 10 mg. Side effects discussed. I need her to do more physical activity and try to do some light exercise. Watch for mood changes. When I see her again the plan would be to add memantine. Agree with no driving. Agree with her  managing her finances and medications. She seems well cared for. 30 minutes of time was taken in total reviewing the medical record to include personal review of the neuropsychological assessment and PET scan, face-to-face time, and time completing documentation today. I reviewed and decided to continue the current medications. This clinical note was dictated with an electronic dictation software that can make unintentional errors. If there are any questions, please contact me directly for clarification.       Shaila Clarke, [address]  Diplomate ABPN

## 2022-08-19 NOTE — PROGRESS NOTES
Chief Complaint   Patient presents with    Follow-up     Memory loss: Patient reports the feeling the same since the last visit.         Visit Vitals  /62 (BP 1 Location: Left upper arm, BP Patient Position: Sitting)   Pulse 64   Resp 16   Ht 5' 3\" (1.6 m)   Wt 117 lb (53.1 kg)   SpO2 99%   BMI 20.73 kg/m²

## 2023-02-07 NOTE — PROGRESS NOTES
Chief Complaint   Patient presents with    Follow-up    Alzheimers         HPI    Mrs Juan M Albarran is a 80 year old female here for follow up. She is a new patient for me. She was last seen in the office by Dr Karissa Adames on 8/19/22 for dementia confirmed by neuropsych testing in 2022. She is on Aricept 10 mg. She is here today with her  who is also her caregiver. He is telling me that he thinks her memory is slightly getting worse. She misplaces things around the house and has a hard time remembering names of friends and family. She does her medication her self but he is there.  is doing all the finances. Cooking less, but eating great. ADLs are done independently. Sleep good, no hallucinations. Mood is stable, not depressed. Not driving anymore. No side effects from the meds. She will watch tv and read the mail and the newspaper. Goes places with . NEUROPSY 2022: This is an abnormal range Neuropsychological Evaluation with respect to neurocognitive functioning. In this regard, she is showing impairments with mental status, verbal fluency, confrontation naming, visual attention, auditory learning, auditory memory,  and executive functioning. Casual language skills and intellectual capacity are important strengths which will serve to mask underlying cognitive deficits at times. From an emotional standpoint, there is no major report of psychopathology. In my opinion, this appears to be a case of mild to moderate dementia. Alzheimer's is likely. I suggest consideration for medication for memory and attention if this/these are not medically contraindicated. Emotional status needs to be monitored over time. She should be encouraged to remain as mentally, socially, and physically active as possible. I do not find her competent and a POA should be established if this has not been done so already. She also likely requires supervision for those domains pertaining to memory. This includes medication management supervision and supervision of financial dealings. Formal driving safety eval.   The family is supportive and so long as they are able to assist with the supervision as noted, I agree with her current living arrangement. Baseline now established. Follow up yearly, or prn. Clinical correlation is, of course, indicated. I will discuss these findings with the patient when she follows up with me in the near future. A follow up Neuropsychological Evaluation is indicated on a prn basis, especially if there are any cognitive and/or emotional changes. 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 She is here with her , Familia Gonzales, to discuss these new symptoms. She tells me she was seen by a neurologist in the community, Dr. Constance Winslow, at some point in time and was started on donepezil. She thinks she has been on donepezil maybe 18 months but she could not recall what testing she has had done for this. She thinks her memory has not been so good for about 5 years mainly with issues remembering short-term situations like names and conversations. She might repeat her questions quickly. When she drives, she is not getting lost but she does take a little bit longer and has to be more careful. Her ADLs are all intact such as dressing bathing and toileting. She does not miss her medications as long as she has her system in place. Sleep is a little bit inconsistent she thinks since she has been taking donepezil at bedtime. She is prediabetic and is very careful about her diet. No unusual headaches. No numbness or weakness. She used to have a history of thyroid dysfunction but no longer takes medication but is unclear of the state of her thyroid.           Review of Systems   Unable to perform ROS: Dementia   Psychiatric/Behavioral: Positive for memory loss. The patient is nervous/anxious. The patient does not have insomnia. All other systems reviewed and are negative. Past Medical History:   Diagnosis Date    Arthritis     osteo    Cancer ([address]     uterine - surgery    Chronic pain     arthritis    GERD (gastroesophageal reflux disease)     Nausea & vomiting     sensitive to anesthesia and pain procedure    Thyroid disease     benign thyroid polyps     Family History   Problem Relation Age of Onset    Parkinsonism Mother     Heart Failure Father         CHF    Heart Disease Father      Social History     Socioeconomic History    Marital status:      Spouse name: Not on file    Number of children: Not on file    Years of education: Not on file    Highest education level: Not on file   Occupational History    Not on file   Tobacco Use    Smoking status: Never    Smokeless tobacco: Never   Vaping Use    Vaping Use: Never used   Substance and Sexual Activity    Alcohol use: Yes     Comment: \"small amount\"    Drug use: No    Sexual activity: Not Currently   Other Topics Concern    Not on file   Social History Narrative    Not on file     Social Determinants of Health     Financial Resource Strain: Not on file   Food Insecurity: Not on file   Transportation Needs: Not on file   Physical Activity: Not on file   Stress: Not on file   Social Connections: Not on file   Intimate Partner Violence: Not on file   Housing Stability: Not on file     Allergies   Allergen Reactions    Other Medication Anaphylaxis     Allergic to horse serum. Shellfish Derived Nausea and Vomiting     Allergic to certain clams and mussels. Tetracycline Other (comments)     Allergic as child, but unsure what. Has taken since without reaction. Current Outpatient Medications   Medication Sig    memantine (Namenda) 5 mg tablet Take 2 Tablets by mouth two (2) times a day.  Take 1 tab x 1 month, then increase to two pills a day  Indications: moderate to severe Alzheimer's type dementia    donepeziL (ARICEPT) 10 mg tablet Take 1 Tablet by mouth daily (with breakfast). amino acids (L-SERINE PO) Take 500 mg by mouth once. diclofenac (VOLTAREN) 1 % gel apply to affected area 4 times a day    ipratropium (ATROVENT) 21 mcg (0.03 %) nasal spray 2 Sprays by Nasal route every twelve (12) hours. OTHER Mature Multi Vitamins & Minerals    Creon 36,000-114,000- 180,000 unit cpDR capsule     dextran 70-hypromellose (ARTIFICIAL TEARS) ophthalmic solution Administer  to both eyes as needed. naproxen sodium (NAPROSYN) 220 mg tablet Take 220 mg by mouth two (2) times daily as needed. No current facility-administered medications for this visit. Neurologic Exam     Mental Status   WD/WN adult in NAD, normal grooming  VSS  A&O x 3, she knows the month date and year. Pleasant and soft-spoken. PERRL, nonicteric  Face is covered  Speech is clear  No limb ataxia. No abnl movements. Moving all extemities spontaneously and symmetric  Def  gait         Visit Vitals  /69 (BP 1 Location: Right arm, BP Patient Position: Sitting, BP Cuff Size: Adult)   Pulse 70   Resp 17   Ht 5' 3\" (1.6 m)   Wt 118 lb (53.5 kg)   SpO2 97%   BMI 20.90 kg/m²     Follow-up and Dispositions    Return in about 6 months (around 8/8/2023). Assessment and Plan   Diagnoses and all orders for this visit:    1. Late onset Alzheimer's dementia without behavioral disturbance (HCC)  -     memantine (Namenda) 5 mg tablet; Take 2 Tablets by mouth two (2) times a day. Take 1 tab x 1 month, then increase to two pills a day  Indications: moderate to severe Alzheimer's type dementia  -     donepeziL (ARICEPT) 10 mg tablet; Take 1 Tablet by mouth daily (with breakfast). 80 year old female with Alzheimer's dementia that is slowly progressing.  takes good care of her. She seems happy and well cared for.  She is having a hard time with short term memory and misplacing things around the home and kitchen. Mood is stable. I would love to her engage in some more activities and physical activity. Continue with the Aricept, I will also add Nemenda 5 mg BID x1 month then increase to 10 mg BID. Side effects discussed. They understand that this is not going to cure, but help slow the progressing down. We talked about the neuropsych testing as well. I will see them back in 6 months. I spent 45 minutes of time today reviewing the medical record and notes, imaging, examining the patient, and face-to-face time, patient/family teaching and medication side effects, and time spent completing documentation.       ROX Regalado

## 2023-02-08 ENCOUNTER — TELEPHONE (OUTPATIENT)
Dept: NEUROLOGY | Age: 83
End: 2023-02-08

## 2023-02-08 ENCOUNTER — OFFICE VISIT (OUTPATIENT)
Dept: NEUROLOGY | Age: 83
End: 2023-02-08
Payer: MEDICARE

## 2023-02-08 VITALS
OXYGEN SATURATION: 97 % | RESPIRATION RATE: 17 BRPM | WEIGHT: 118 LBS | HEART RATE: 70 BPM | DIASTOLIC BLOOD PRESSURE: 69 MMHG | HEIGHT: 63 IN | SYSTOLIC BLOOD PRESSURE: 119 MMHG | BODY MASS INDEX: 20.91 KG/M2

## 2023-02-08 DIAGNOSIS — F02.80 LATE ONSET ALZHEIMER'S DEMENTIA WITHOUT BEHAVIORAL DISTURBANCE (HCC): Primary | ICD-10-CM

## 2023-02-08 DIAGNOSIS — G30.1 LATE ONSET ALZHEIMER'S DEMENTIA WITHOUT BEHAVIORAL DISTURBANCE (HCC): Primary | ICD-10-CM

## 2023-02-08 PROCEDURE — 99215 OFFICE O/P EST HI 40 MIN: CPT

## 2023-02-08 PROCEDURE — G8400 PT W/DXA NO RESULTS DOC: HCPCS

## 2023-02-08 PROCEDURE — G8427 DOCREV CUR MEDS BY ELIG CLIN: HCPCS

## 2023-02-08 PROCEDURE — 1090F PRES/ABSN URINE INCON ASSESS: CPT

## 2023-02-08 PROCEDURE — 1123F ACP DISCUSS/DSCN MKR DOCD: CPT

## 2023-02-08 PROCEDURE — 1101F PT FALLS ASSESS-DOCD LE1/YR: CPT

## 2023-02-08 PROCEDURE — G8420 CALC BMI NORM PARAMETERS: HCPCS

## 2023-02-08 PROCEDURE — G8432 DEP SCR NOT DOC, RNG: HCPCS

## 2023-02-08 PROCEDURE — G8536 NO DOC ELDER MAL SCRN: HCPCS

## 2023-02-08 RX ORDER — DONEPEZIL HYDROCHLORIDE 10 MG/1
10 TABLET, FILM COATED ORAL
Qty: 90 TABLET | Refills: 2 | Status: SHIPPED | OUTPATIENT
Start: 2023-02-08

## 2023-02-08 RX ORDER — MEMANTINE HYDROCHLORIDE 5 MG/1
10 TABLET ORAL 2 TIMES DAILY
Qty: 360 TABLET | Refills: 1 | Status: SHIPPED | OUTPATIENT
Start: 2023-02-08

## 2023-02-08 NOTE — PROGRESS NOTES
Chief Complaint   Patient presents with    Follow-up    Alzheimers    Visit Vitals  /69 (BP 1 Location: Right arm, BP Patient Position: Sitting, BP Cuff Size: Adult)   Pulse 70   Resp 17   Ht 5' 3\" (1.6 m)   Wt 118 lb (53.5 kg)   SpO2 97%   BMI 20.90 kg/m²

## 2023-02-08 NOTE — TELEPHONE ENCOUNTER
Call received from [employer]. Clarified [facility] as Namenda 5mg 1 tab po q day x 1 month then increase to namenda 5mg 1 tab po bid.

## 2023-06-30 ENCOUNTER — OFFICE VISIT (OUTPATIENT)
Age: 83
End: 2023-06-30
Payer: MEDICARE

## 2023-06-30 VITALS
WEIGHT: 118 LBS | RESPIRATION RATE: 16 BRPM | OXYGEN SATURATION: 97 % | HEIGHT: 63 IN | BODY MASS INDEX: 20.91 KG/M2 | DIASTOLIC BLOOD PRESSURE: 68 MMHG | HEART RATE: 74 BPM | SYSTOLIC BLOOD PRESSURE: 117 MMHG

## 2023-06-30 DIAGNOSIS — R55 SYNCOPE AND COLLAPSE: Primary | ICD-10-CM

## 2023-06-30 DIAGNOSIS — G30.1 ALZHEIMER'S DISEASE WITH LATE ONSET (CODE) (HCC): ICD-10-CM

## 2023-06-30 DIAGNOSIS — E86.0 DEHYDRATION: ICD-10-CM

## 2023-06-30 PROCEDURE — G8400 PT W/DXA NO RESULTS DOC: HCPCS

## 2023-06-30 PROCEDURE — 99214 OFFICE O/P EST MOD 30 MIN: CPT

## 2023-06-30 PROCEDURE — 1090F PRES/ABSN URINE INCON ASSESS: CPT

## 2023-06-30 PROCEDURE — G8428 CUR MEDS NOT DOCUMENT: HCPCS

## 2023-06-30 PROCEDURE — 1036F TOBACCO NON-USER: CPT

## 2023-06-30 PROCEDURE — 1123F ACP DISCUSS/DSCN MKR DOCD: CPT

## 2023-06-30 PROCEDURE — G8420 CALC BMI NORM PARAMETERS: HCPCS

## 2023-06-30 ASSESSMENT — PATIENT HEALTH QUESTIONNAIRE - PHQ9
SUM OF ALL RESPONSES TO PHQ QUESTIONS 1-9: 0
SUM OF ALL RESPONSES TO PHQ9 QUESTIONS 1 & 2: 0
1. LITTLE INTEREST OR PLEASURE IN DOING THINGS: 0
2. FEELING DOWN, DEPRESSED OR HOPELESS: 0

## 2023-07-01 LAB
BASOPHILS # BLD AUTO: 0 X10E3/UL (ref 0–0.2)
BASOPHILS NFR BLD AUTO: 1 %
BUN SERPL-MCNC: 28 MG/DL (ref 8–27)
BUN/CREAT SERPL: 41 (ref 12–28)
CALCIUM SERPL-MCNC: 9.7 MG/DL (ref 8.7–10.3)
CHLORIDE SERPL-SCNC: 101 MMOL/L (ref 96–106)
CO2 SERPL-SCNC: 26 MMOL/L (ref 20–29)
CREAT SERPL-MCNC: 0.68 MG/DL (ref 0.57–1)
EGFRCR SERPLBLD CKD-EPI 2021: 87 ML/MIN/1.73
EOSINOPHIL # BLD AUTO: 0.1 X10E3/UL (ref 0–0.4)
EOSINOPHIL NFR BLD AUTO: 1 %
ERYTHROCYTE [DISTWIDTH] IN BLOOD BY AUTOMATED COUNT: 11.9 % (ref 11.7–15.4)
GLUCOSE SERPL-MCNC: 103 MG/DL (ref 70–99)
HBA1C MFR BLD: 6.4 % (ref 4.8–5.6)
HCT VFR BLD AUTO: 39.1 % (ref 34–46.6)
HGB BLD-MCNC: 12.9 G/DL (ref 11.1–15.9)
IMM GRANULOCYTES # BLD AUTO: 0 X10E3/UL (ref 0–0.1)
IMM GRANULOCYTES NFR BLD AUTO: 0 %
LYMPHOCYTES # BLD AUTO: 1.8 X10E3/UL (ref 0.7–3.1)
LYMPHOCYTES NFR BLD AUTO: 25 %
MCH RBC QN AUTO: 29.9 PG (ref 26.6–33)
MCHC RBC AUTO-ENTMCNC: 33 G/DL (ref 31.5–35.7)
MCV RBC AUTO: 91 FL (ref 79–97)
MONOCYTES # BLD AUTO: 0.5 X10E3/UL (ref 0.1–0.9)
MONOCYTES NFR BLD AUTO: 7 %
NEUTROPHILS # BLD AUTO: 4.9 X10E3/UL (ref 1.4–7)
NEUTROPHILS NFR BLD AUTO: 66 %
PLATELET # BLD AUTO: 176 X10E3/UL (ref 150–450)
POTASSIUM SERPL-SCNC: 4.2 MMOL/L (ref 3.5–5.2)
RBC # BLD AUTO: 4.31 X10E6/UL (ref 3.77–5.28)
SODIUM SERPL-SCNC: 143 MMOL/L (ref 134–144)
VIT B12 SERPL-MCNC: 1284 PG/ML (ref 232–1245)
WBC # BLD AUTO: 7.3 X10E3/UL (ref 3.4–10.8)

## 2023-07-03 ENCOUNTER — TELEPHONE (OUTPATIENT)
Age: 83
End: 2023-07-03

## 2023-07-03 NOTE — TELEPHONE ENCOUNTER
Call placed to patient. LVM on home and cell phone. LVM stating that lab results showed elevated B 12 levels and elevated HgbA1c. Advised per Ny Chu to stop taking any B supplements. Also advised to watch intake of sugars and sweets and to increase proteins and veggies. Also advised to increase water intake. If she has any more syncopal episodes patient needs to alert our office or go to ER for evaluation and treatment.

## 2023-08-03 ENCOUNTER — OFFICE VISIT (OUTPATIENT)
Age: 83
End: 2023-08-03
Payer: MEDICARE

## 2023-08-03 VITALS
OXYGEN SATURATION: 98 % | BODY MASS INDEX: 21.93 KG/M2 | SYSTOLIC BLOOD PRESSURE: 118 MMHG | WEIGHT: 123.8 LBS | DIASTOLIC BLOOD PRESSURE: 60 MMHG | HEART RATE: 72 BPM

## 2023-08-03 DIAGNOSIS — G30.1 ALZHEIMER'S DISEASE WITH LATE ONSET (CODE) (HCC): Primary | ICD-10-CM

## 2023-08-03 DIAGNOSIS — R55 SYNCOPE, UNSPECIFIED SYNCOPE TYPE: ICD-10-CM

## 2023-08-03 DIAGNOSIS — I67.89 CEREBRAL MICROVASCULAR DISEASE: ICD-10-CM

## 2023-08-03 PROCEDURE — 1123F ACP DISCUSS/DSCN MKR DOCD: CPT | Performed by: PSYCHIATRY & NEUROLOGY

## 2023-08-03 PROCEDURE — 1090F PRES/ABSN URINE INCON ASSESS: CPT | Performed by: PSYCHIATRY & NEUROLOGY

## 2023-08-03 PROCEDURE — G8420 CALC BMI NORM PARAMETERS: HCPCS | Performed by: PSYCHIATRY & NEUROLOGY

## 2023-08-03 PROCEDURE — 99215 OFFICE O/P EST HI 40 MIN: CPT | Performed by: PSYCHIATRY & NEUROLOGY

## 2023-08-03 PROCEDURE — G8400 PT W/DXA NO RESULTS DOC: HCPCS | Performed by: PSYCHIATRY & NEUROLOGY

## 2023-08-03 PROCEDURE — G8427 DOCREV CUR MEDS BY ELIG CLIN: HCPCS | Performed by: PSYCHIATRY & NEUROLOGY

## 2023-08-03 PROCEDURE — 1036F TOBACCO NON-USER: CPT | Performed by: PSYCHIATRY & NEUROLOGY

## 2023-08-03 RX ORDER — M-VIT,TX,IRON,MINS/CALC/FOLIC 27MG-0.4MG
1 TABLET ORAL DAILY
COMMUNITY

## 2023-08-03 RX ORDER — MEMANTINE HYDROCHLORIDE 10 MG/1
10 TABLET ORAL 2 TIMES DAILY
Qty: 180 TABLET | Refills: 1 | Status: SHIPPED | OUTPATIENT
Start: 2023-08-03

## 2023-08-03 RX ORDER — DONEPEZIL HYDROCHLORIDE 10 MG/1
10 TABLET, FILM COATED ORAL
Qty: 90 TABLET | Refills: 1 | Status: SHIPPED | OUTPATIENT
Start: 2023-08-03

## 2023-08-03 RX ORDER — SERINE 100 %
CRYSTALS MISCELLANEOUS DAILY
COMMUNITY

## 2023-08-03 NOTE — PROGRESS NOTES
Neurology Clinic Follow up Note    Patient ID:  Haydee Vital  [id number]  72 y.o.  1940      Ms. Jeremi Patel is here for follow up today of  Chief Complaint   Patient presents with    Other     Pt returning for memory loss  and recent syncope episodes. Last Appointment With Me:  None, last seen by Aurelia Vela 6/30/23, formerly Dr. Gerda Haq Jeremi Patel is a 80year old female here for follow up. I saw her last on 2/8/23 for Alzheimer's dementia. She is on Aricept and started on Nemenda last visit. Since her last visit her  is reporting that she has been very dizzy and falling 3 times in the last 1.5 months. Second fall was possible a trip over something. It was non-witnessed and resulted in a broken hand. Last episode happened yesterday and was witnessed by . She went down and had LOC x a few mins. Checked BP and pulse were all normal.  is reporting that she has not been drinking a lot lately. She does eat. PCP was called yesterday and they were told to come here instead. Blood work last month was normal.     BACKGROUND  Mrs Jeremi Patel is a 80 year old female here for follow up. She is a new patient for me. She was last seen in the office by Dr Sherly Villalobos on 8/19/22 for dementia confirmed by neuropsych testing in 2022. She is on Aricept 10 mg. She is here today with her  who is also her caregiver. He is telling me that he thinks her memory is slightly getting worse. She misplaces things around the house and has a hard time remembering names of friends and family. She does her medication her self but he is there.  is doing all the finances. Cooking less, but eating great. ADLs are done independently. Sleep good, no hallucinations. Mood is stable, not depressed. Not driving anymore. No side effects from the meds. She will watch tv and read the mail and the newspaper. Goes places with . \"    Interval History:   She is here with her  (retired physician).  Pt

## 2023-09-06 ENCOUNTER — HOSPITAL ENCOUNTER (OUTPATIENT)
Facility: HOSPITAL | Age: 83
Discharge: HOME | End: 2023-09-09
Attending: PSYCHIATRY & NEUROLOGY
Payer: MEDICARE

## 2023-09-06 DIAGNOSIS — R55 SYNCOPE, UNSPECIFIED SYNCOPE TYPE: ICD-10-CM

## 2023-09-06 DIAGNOSIS — G30.1 ALZHEIMER'S DISEASE WITH LATE ONSET (CODE) (HCC): ICD-10-CM

## 2023-09-06 PROCEDURE — 95816 EEG AWAKE AND DROWSY: CPT

## 2023-09-06 PROCEDURE — 70551 MRI BRAIN STEM W/O DYE: CPT

## 2023-09-06 PROCEDURE — 95816 EEG AWAKE AND DROWSY: CPT | Performed by: PSYCHIATRY & NEUROLOGY

## 2023-09-06 NOTE — PROCEDURES
[address] [place] [address]  EEG    Name:  Stefania Rivas  MR#:  [id number]  :  1940  ACCOUNT #:  [de-identified]  DATE OF SERVICE:  2023    REQUESTING PHYSICIAN:  Nitish Scott DO    HISTORY:  The patient is an 80-year-old female who is being evaluated after an episode of syncope. DESCRIPTION:  This is an 18-channel EEG performed on an awake patient. Dominant posterior background rhythm consists of symmetric, very well-modulated medium voltage rhythms in the 9-10 Hz frequency range out of the posterior head region. Eye blink and muscle tension artifact is seen in the frontal areas frequently. Drowsiness and sleep states are not recorded. Photic stimulation elicits a symmetric driving response. Hyperventilation was not performed. EEG SUMMARY:  Normal study. CLINICAL INTERPRETATION:  This was a normal EEG during wakeful state of the patient. No lateralizing or epileptiform features were noted.       Maylin Sy MD      [id number]  D:  2023 15:17  T:  2023 18:52  JOB #:  [id number]

## 2023-09-07 ENCOUNTER — TELEPHONE (OUTPATIENT)
Age: 83
End: 2023-09-07

## 2023-09-07 NOTE — TELEPHONE ENCOUNTER
Dr. Florencia Patel stated there are some confusion regarding patients medical records and is requesting a phone call to discuss. Dr. Florencia Patel stated either nurse or LPN can give him a call.     Please contact

## 2023-09-08 NOTE — TELEPHONE ENCOUNTER
Called and spoke to Dr. Medardo Sotelo about the Pt. She just completed EEG however there is anther order on the chart for EEG awake and asleep. Is she to have another one done? Also wants it documented she has had all her vaccines.

## 2023-09-11 ENCOUNTER — TELEPHONE (OUTPATIENT)
Age: 83
End: 2023-09-11

## 2023-09-11 NOTE — TELEPHONE ENCOUNTER
Call placed to patient.   LVM on identified line advising per Dr. Renetta Schrader that results of EEG were normal.

## 2023-09-11 NOTE — TELEPHONE ENCOUNTER
Called patient's . Informed him that the doctor stated she does not need to do the 2nd order of EEG.

## 2023-09-12 ENCOUNTER — TELEPHONE (OUTPATIENT)
Age: 83
End: 2023-09-12

## 2023-09-12 NOTE — TELEPHONE ENCOUNTER
Called and spoke to Dr. Bryant Simpler the Pt's . Per Dr. Kenya Fierro:    MRI Brain does not reveal evidence of any acute pathology, again noted severe chronic microvascular ischemic changes with evidence of remote lacunar infarcts and severe temporal atrophy as may be seen with Alzheimer's dementia    He stated he had already read the results and thanked me for calling.

## 2023-09-12 NOTE — TELEPHONE ENCOUNTER
----- Message from [facility] ED, DO sent at 9/7/2023  1:51 PM EDT -----  MRI Brain does not reveal evidence of any acute pathology, again noted severe chronic microvascular ischemic changes with evidence of remote lacunar infarcts and severe temporal atrophy as may be seen with Alzheimer's dementia.  D  ----- Message -----  From: Carri Lemon Incoming Orders Results To Radiant  Sent: 9/6/2023   7:11 PM EDT  To: [facility] ED, DO

## 2024-02-06 ENCOUNTER — OFFICE VISIT (OUTPATIENT)
Age: 84
End: 2024-02-06
Payer: MEDICARE

## 2024-02-06 VITALS — WEIGHT: 123 LBS | BODY MASS INDEX: 21.79 KG/M2 | SYSTOLIC BLOOD PRESSURE: 128 MMHG | DIASTOLIC BLOOD PRESSURE: 62 MMHG

## 2024-02-06 DIAGNOSIS — R55 SYNCOPE, UNSPECIFIED SYNCOPE TYPE: ICD-10-CM

## 2024-02-06 DIAGNOSIS — I67.89 CEREBRAL MICROVASCULAR DISEASE: ICD-10-CM

## 2024-02-06 DIAGNOSIS — G30.1 ALZHEIMER'S DISEASE WITH LATE ONSET (CODE) (HCC): Primary | ICD-10-CM

## 2024-02-06 PROCEDURE — 1123F ACP DISCUSS/DSCN MKR DOCD: CPT | Performed by: PSYCHIATRY & NEUROLOGY

## 2024-02-06 PROCEDURE — G8427 DOCREV CUR MEDS BY ELIG CLIN: HCPCS | Performed by: PSYCHIATRY & NEUROLOGY

## 2024-02-06 PROCEDURE — G8420 CALC BMI NORM PARAMETERS: HCPCS | Performed by: PSYCHIATRY & NEUROLOGY

## 2024-02-06 PROCEDURE — 1036F TOBACCO NON-USER: CPT | Performed by: PSYCHIATRY & NEUROLOGY

## 2024-02-06 PROCEDURE — G8484 FLU IMMUNIZE NO ADMIN: HCPCS | Performed by: PSYCHIATRY & NEUROLOGY

## 2024-02-06 PROCEDURE — G8400 PT W/DXA NO RESULTS DOC: HCPCS | Performed by: PSYCHIATRY & NEUROLOGY

## 2024-02-06 PROCEDURE — 99214 OFFICE O/P EST MOD 30 MIN: CPT | Performed by: PSYCHIATRY & NEUROLOGY

## 2024-02-06 PROCEDURE — 1090F PRES/ABSN URINE INCON ASSESS: CPT | Performed by: PSYCHIATRY & NEUROLOGY

## 2024-02-06 RX ORDER — ALENDRONATE SODIUM 70 MG/1
70 TABLET ORAL
COMMUNITY
Start: 2024-01-22

## 2024-02-06 RX ORDER — METFORMIN HYDROCHLORIDE 500 MG/1
500 TABLET, EXTENDED RELEASE ORAL
COMMUNITY
Start: 2024-01-27

## 2024-02-06 RX ORDER — DONEPEZIL HYDROCHLORIDE 10 MG/1
10 TABLET, FILM COATED ORAL
Qty: 90 TABLET | Refills: 1 | Status: SHIPPED | OUTPATIENT
Start: 2024-02-06

## 2024-02-06 RX ORDER — MEMANTINE HYDROCHLORIDE 21 MG/1
21 CAPSULE, EXTENDED RELEASE ORAL DAILY
Qty: 90 CAPSULE | Refills: 1 | Status: SHIPPED | OUTPATIENT
Start: 2024-02-06

## 2024-02-06 NOTE — PROGRESS NOTES
Neurology Clinic Follow up Note    Patient ID:  Amrita Larson  [id number]  83 y.o.  1940      Ms. Larson is here for follow up today of  Chief Complaint   Patient presents with    OTHER     Pt returning for H/O ALZ.and syncope episodes.  Pt and   says no more syncope episodes and Memory loss has gotten worse.         Last Appointment With Me:  8/3/2023, last seen by Guanaco Lainez 6/30/23, formerly Dr. Hall    \"Mrs Larson is here for follow up. I saw her last on 2/8/23 for Alzheimer's dementia. She is on Aricept and started on Nemenda last visit. Since her last visit her  is reporting that she has been very dizzy and falling 3 times in the last 1.5 months. Second fall was possible a trip over something. It was non-witnessed and resulted in a broken hand. Last episode happened yesterday and was witnessed by . She went down and had LOC x a few mins. Checked BP and pulse were all normal.  is reporting that she has not been drinking a lot lately. She does eat. PCP was called yesterday and they were told to come here instead. Blood work last month was normal.     BACKGROUND  Mrs Larson is here for follow up. She is a new patient for me. She was last seen in the office by Dr Hall on 8/19/22 for dementia confirmed by neuropsych testing in 2022. She is on Aricept 10 mg. She is here today with her  who is also her caregiver. He is telling me that he thinks her memory is slightly getting worse. She misplaces things around the house and has a hard time remembering names of friends and family. She does her medication her self but he is there.  is doing all the finances. Cooking less, but eating great. ADLs are done independently. Sleep good, no hallucinations. Mood is stable, not depressed. Not driving anymore. No side effects from the meds. She will watch tv and read the mail and the newspaper. Goes places with .\"    Interval History:   She is here with her 
goran

## 2024-05-18 ENCOUNTER — TELEPHONE (OUTPATIENT)
Age: 84
End: 2024-05-18

## 2024-05-20 RX ORDER — MEMANTINE HYDROCHLORIDE 10 MG/1
10 TABLET ORAL 2 TIMES DAILY
Qty: 180 TABLET | Refills: 0 | OUTPATIENT
Start: 2024-05-20

## 2024-05-20 NOTE — TELEPHONE ENCOUNTER
Called Dr. Young regarding the denied refill (memantine [id number] 2 BID). Stated the office visit on 02/06/24 she was to transitioned to namenda XR 21mg/day. He stated that she was doing well on just memantine HCL 10mg BID and that the XR costs more. He stated that they discussed to stay on the 10mg BID. Informed him that we did not receive a call from the patient on this but will definitely inform the provider.

## 2024-05-20 NOTE — TELEPHONE ENCOUNTER
Dr. Young is calling on behalf of patient regarding medication Memantine 10 mg tablet. States that medication refill was denied and is unsure the reason why. Patient has a couple tablets left. Dr. Young states that return call is urgent.    Dr. Young contact # [phone]

## 2024-05-21 DIAGNOSIS — G30.1 ALZHEIMER'S DISEASE WITH LATE ONSET (CODE) (HCC): Primary | ICD-10-CM

## 2024-05-21 RX ORDER — MEMANTINE HYDROCHLORIDE 10 MG/1
10 TABLET ORAL 2 TIMES DAILY
Qty: 180 TABLET | Refills: 1 | Status: SHIPPED | OUTPATIENT
Start: 2024-05-21

## 2024-05-21 NOTE — TELEPHONE ENCOUNTER
Called patient's  and informed him that the doctor sent in the refill as a 90 day supply and 1 refill.

## 2024-07-19 DIAGNOSIS — G30.1 ALZHEIMER'S DISEASE WITH LATE ONSET (CODE) (HCC): ICD-10-CM

## 2024-07-19 NOTE — TELEPHONE ENCOUNTER
LOV: 02/06/24  Last refill: 02/06/24 with 1 refill-90 day supply  Next visit: 09/12/24    Should be out by 08/06/24

## 2024-07-23 RX ORDER — DONEPEZIL HYDROCHLORIDE 10 MG/1
10 TABLET, FILM COATED ORAL DAILY
Qty: 30 TABLET | Refills: 0 | Status: SHIPPED | OUTPATIENT
Start: 2024-07-23

## 2024-08-19 DIAGNOSIS — G30.1 ALZHEIMER'S DISEASE WITH LATE ONSET (CODE) (HCC): ICD-10-CM

## 2024-08-21 RX ORDER — DONEPEZIL HYDROCHLORIDE 10 MG/1
10 TABLET, FILM COATED ORAL DAILY
Qty: 30 TABLET | Refills: 0 | Status: SHIPPED | OUTPATIENT
Start: 2024-08-21

## 2024-09-12 ENCOUNTER — OFFICE VISIT (OUTPATIENT)
Age: 84
End: 2024-09-12
Payer: MEDICARE

## 2024-09-12 VITALS
DIASTOLIC BLOOD PRESSURE: 62 MMHG | RESPIRATION RATE: 16 BRPM | OXYGEN SATURATION: 97 % | HEART RATE: 71 BPM | WEIGHT: 125 LBS | HEIGHT: 63 IN | SYSTOLIC BLOOD PRESSURE: 106 MMHG | BODY MASS INDEX: 22.15 KG/M2

## 2024-09-12 DIAGNOSIS — R55 SYNCOPE, UNSPECIFIED SYNCOPE TYPE: ICD-10-CM

## 2024-09-12 DIAGNOSIS — I67.89 CEREBRAL MICROVASCULAR DISEASE: ICD-10-CM

## 2024-09-12 DIAGNOSIS — G30.1 ALZHEIMER'S DISEASE WITH LATE ONSET (CODE) (HCC): Primary | ICD-10-CM

## 2024-09-12 PROCEDURE — G8427 DOCREV CUR MEDS BY ELIG CLIN: HCPCS | Performed by: PSYCHIATRY & NEUROLOGY

## 2024-09-12 PROCEDURE — G8420 CALC BMI NORM PARAMETERS: HCPCS | Performed by: PSYCHIATRY & NEUROLOGY

## 2024-09-12 PROCEDURE — 1090F PRES/ABSN URINE INCON ASSESS: CPT | Performed by: PSYCHIATRY & NEUROLOGY

## 2024-09-12 PROCEDURE — 99214 OFFICE O/P EST MOD 30 MIN: CPT | Performed by: PSYCHIATRY & NEUROLOGY

## 2024-09-12 PROCEDURE — 1036F TOBACCO NON-USER: CPT | Performed by: PSYCHIATRY & NEUROLOGY

## 2024-09-12 PROCEDURE — G8400 PT W/DXA NO RESULTS DOC: HCPCS | Performed by: PSYCHIATRY & NEUROLOGY

## 2024-09-12 PROCEDURE — 1123F ACP DISCUSS/DSCN MKR DOCD: CPT | Performed by: PSYCHIATRY & NEUROLOGY

## 2024-09-12 RX ORDER — MEMANTINE HYDROCHLORIDE 10 MG/1
10 TABLET ORAL 2 TIMES DAILY
Qty: 180 TABLET | Refills: 1 | Status: SHIPPED | OUTPATIENT
Start: 2024-09-12

## 2024-09-12 RX ORDER — DONEPEZIL HYDROCHLORIDE 10 MG/1
10 TABLET, FILM COATED ORAL DAILY
Qty: 90 TABLET | Refills: 1 | Status: SHIPPED | OUTPATIENT
Start: 2024-09-12

## 2024-09-12 ASSESSMENT — PATIENT HEALTH QUESTIONNAIRE - PHQ9
SUM OF ALL RESPONSES TO PHQ QUESTIONS 1-9: 0
2. FEELING DOWN, DEPRESSED OR HOPELESS: NOT AT ALL
SUM OF ALL RESPONSES TO PHQ QUESTIONS 1-9: 0
SUM OF ALL RESPONSES TO PHQ9 QUESTIONS 1 & 2: 0
1. LITTLE INTEREST OR PLEASURE IN DOING THINGS: NOT AT ALL

## 2024-09-17 ENCOUNTER — TELEPHONE (OUTPATIENT)
Age: 84
End: 2024-09-17

## 2024-09-19 DIAGNOSIS — G30.1 ALZHEIMER'S DISEASE WITH LATE ONSET (CODE) (HCC): ICD-10-CM

## 2024-09-19 RX ORDER — DONEPEZIL HYDROCHLORIDE 10 MG/1
10 TABLET, FILM COATED ORAL DAILY
Qty: 90 TABLET | Refills: 1 | Status: SHIPPED | OUTPATIENT
Start: 2024-09-19

## 2024-09-19 RX ORDER — MEMANTINE HYDROCHLORIDE 10 MG/1
10 TABLET ORAL 2 TIMES DAILY
Qty: 180 TABLET | Refills: 1 | Status: SHIPPED | OUTPATIENT
Start: 2024-09-19

## 2025-01-27 ENCOUNTER — TELEPHONE (OUTPATIENT)
Age: 85
End: 2025-01-27

## 2025-01-27 NOTE — TELEPHONE ENCOUNTER
Dr. Young calling requesting a call back from Cheli. I asked if I could take a message and he stated it was too complex..     Please call.

## 2025-01-30 ENCOUNTER — TELEPHONE (OUTPATIENT)
Age: 85
End: 2025-01-30

## 2025-01-30 NOTE — TELEPHONE ENCOUNTER
Dr. Young would like a call back regarding administrative work he needs for the patient.    He can be reached at [phone] or on his mobile at [phone].    No further information was shared with the PSR.

## 2025-02-14 ENCOUNTER — TELEPHONE (OUTPATIENT)
Age: 85
End: 2025-02-14

## 2025-02-14 NOTE — TELEPHONE ENCOUNTER
Patient's spouse Dr. Young called to let Dr. Rincon know \"on second thought it will require an original letter.\"    This is for social security purposes.    Please mail this letter to the address on file.